# Patient Record
Sex: MALE | Race: WHITE | Employment: FULL TIME | URBAN - METROPOLITAN AREA
[De-identification: names, ages, dates, MRNs, and addresses within clinical notes are randomized per-mention and may not be internally consistent; named-entity substitution may affect disease eponyms.]

---

## 2020-09-16 ENCOUNTER — TELEPHONE (OUTPATIENT)
Dept: PRIMARY CARE CLINIC | Age: 30
End: 2020-09-16

## 2020-09-16 NOTE — PROGRESS NOTES
Needs    Financial resource strain: Not on file    Food insecurity     Worry: Not on file     Inability: Not on file    Transportation needs     Medical: Not on file     Non-medical: Not on file   Tobacco Use    Smoking status: Never Smoker    Smokeless tobacco: Never Used   Substance and Sexual Activity    Alcohol use: Yes     Comment: 2 times a month    Drug use: Never    Sexual activity: Yes   Lifestyle    Physical activity     Days per week: Not on file     Minutes per session: Not on file    Stress: Not on file   Relationships    Social connections     Talks on phone: Not on file     Gets together: Not on file     Attends Faith service: Not on file     Active member of club or organization: Not on file     Attends meetings of clubs or organizations: Not on file     Relationship status: Not on file    Intimate partner violence     Fear of current or ex partner: Not on file     Emotionally abused: Not on file     Physically abused: Not on file     Forced sexual activity: Not on file   Other Topics Concern    Not on file   Social History Narrative    Not on file        Family History   Problem Relation Age of Onset    No Known Problems Mother     High Blood Pressure Father     Cancer Maternal Grandmother     Cancer Maternal Grandfather        Vitals:    09/17/20 1102 09/17/20 1136   BP: (!) 145/66 124/72   Pulse: 70    Temp: 96.2 °F (35.7 °C)    TempSrc: Oral    Weight: 212 lb 2 oz (96.2 kg)    Height: 6' 2\" (1.88 m)      Estimated body mass index is 27.24 kg/m² as calculated from the following:    Height as of this encounter: 6' 2\" (1.88 m). Weight as of this encounter: 212 lb 2 oz (96.2 kg). Physical Exam  Vitals signs reviewed. Constitutional:       Appearance: Normal appearance. He is normal weight. Comments: Very muscular; BMI not reflective of true fat composition   HENT:      Head: Normocephalic and atraumatic.       Nose: Nose normal.   Eyes:      Extraocular Movements: Extraocular movements intact. Conjunctiva/sclera: Conjunctivae normal.   Neck:      Musculoskeletal: Normal range of motion and neck supple. Cardiovascular:      Rate and Rhythm: Normal rate and regular rhythm. Pulses: Normal pulses. Pulmonary:      Effort: Pulmonary effort is normal.      Breath sounds: Normal breath sounds. Abdominal:      General: Abdomen is flat. Bowel sounds are normal.      Palpations: Abdomen is soft. Musculoskeletal: Normal range of motion. Skin:     General: Skin is warm and dry. Capillary Refill: Capillary refill takes less than 2 seconds. Findings: No rash. Neurological:      General: No focal deficit present. Mental Status: He is alert and oriented to person, place, and time. Mental status is at baseline. Psychiatric:         Mood and Affect: Mood normal.         Behavior: Behavior normal.         Thought Content: Thought content normal.         Judgment: Judgment normal.         Separate Identifiable issues addressed today:    ASSESSMENT/PLAN:  Ayaka Ledezma was seen today for annual exam and new patient. Diagnoses and all orders for this visit:    Encounter for medical examination to establish care: Does outstanding with diet, exercise. Encourage continuation. Do worry about sustainability.  -     CBC; Future  -     COMPREHENSIVE METABOLIC PANEL; Future  -     Testosterone, free, total; Future    Screening for HIV (human immunodeficiency virus)  -     HIV-1 AND HIV-2 ANTIBODIES; Future    Screening, lipid  -     Lipid, Fasting; Future    Screening examination for STD (sexually transmitted disease): currently monogamous, multiple partners in past. Would like screen.  -     C.trachomatis N.gonorrhoeae DNA, Urine; Future      Return in about 1 year (around 9/17/2021). An  electronic signature was used to authenticate this note.     --Jennifer Ash,  on 9/17/2020 at 12:12 PM

## 2020-09-17 ENCOUNTER — OFFICE VISIT (OUTPATIENT)
Dept: PRIMARY CARE CLINIC | Age: 30
End: 2020-09-17
Payer: COMMERCIAL

## 2020-09-17 VITALS
WEIGHT: 212.13 LBS | DIASTOLIC BLOOD PRESSURE: 72 MMHG | SYSTOLIC BLOOD PRESSURE: 124 MMHG | HEART RATE: 70 BPM | BODY MASS INDEX: 27.22 KG/M2 | TEMPERATURE: 96.2 F | HEIGHT: 74 IN

## 2020-09-17 DIAGNOSIS — Z11.3 SCREENING EXAMINATION FOR STD (SEXUALLY TRANSMITTED DISEASE): ICD-10-CM

## 2020-09-17 DIAGNOSIS — Z13.220 SCREENING, LIPID: ICD-10-CM

## 2020-09-17 DIAGNOSIS — Z00.00 ENCOUNTER FOR MEDICAL EXAMINATION TO ESTABLISH CARE: ICD-10-CM

## 2020-09-17 DIAGNOSIS — Z11.4 SCREENING FOR HIV (HUMAN IMMUNODEFICIENCY VIRUS): ICD-10-CM

## 2020-09-17 LAB
A/G RATIO: 2 (ref 1.1–2.2)
ALBUMIN SERPL-MCNC: 4.9 G/DL (ref 3.4–5)
ALP BLD-CCNC: 47 U/L (ref 40–129)
ALT SERPL-CCNC: 23 U/L (ref 10–40)
ANION GAP SERPL CALCULATED.3IONS-SCNC: 9 MMOL/L (ref 3–16)
AST SERPL-CCNC: 25 U/L (ref 15–37)
BILIRUB SERPL-MCNC: 0.7 MG/DL (ref 0–1)
BUN BLDV-MCNC: 18 MG/DL (ref 7–20)
CALCIUM SERPL-MCNC: 10.1 MG/DL (ref 8.3–10.6)
CHLORIDE BLD-SCNC: 103 MMOL/L (ref 99–110)
CHOLESTEROL, FASTING: 182 MG/DL (ref 0–199)
CO2: 25 MMOL/L (ref 21–32)
CREAT SERPL-MCNC: 1.1 MG/DL (ref 0.9–1.3)
GFR AFRICAN AMERICAN: >60
GFR NON-AFRICAN AMERICAN: >60
GLOBULIN: 2.4 G/DL
GLUCOSE BLD-MCNC: 87 MG/DL (ref 70–99)
HCT VFR BLD CALC: 47.3 % (ref 40.5–52.5)
HDLC SERPL-MCNC: 59 MG/DL (ref 40–60)
HEMOGLOBIN: 15.6 G/DL (ref 13.5–17.5)
LDL CHOLESTEROL CALCULATED: 113 MG/DL
MCH RBC QN AUTO: 30.1 PG (ref 26–34)
MCHC RBC AUTO-ENTMCNC: 33 G/DL (ref 31–36)
MCV RBC AUTO: 91.3 FL (ref 80–100)
PDW BLD-RTO: 12.8 % (ref 12.4–15.4)
PLATELET # BLD: 231 K/UL (ref 135–450)
PMV BLD AUTO: 9.1 FL (ref 5–10.5)
POTASSIUM SERPL-SCNC: 5.7 MMOL/L (ref 3.5–5.1)
RBC # BLD: 5.18 M/UL (ref 4.2–5.9)
SODIUM BLD-SCNC: 137 MMOL/L (ref 136–145)
TOTAL PROTEIN: 7.3 G/DL (ref 6.4–8.2)
TRIGLYCERIDE, FASTING: 49 MG/DL (ref 0–150)
VLDLC SERPL CALC-MCNC: 10 MG/DL
WBC # BLD: 8.9 K/UL (ref 4–11)

## 2020-09-17 PROCEDURE — 99385 PREV VISIT NEW AGE 18-39: CPT | Performed by: STUDENT IN AN ORGANIZED HEALTH CARE EDUCATION/TRAINING PROGRAM

## 2020-09-17 RX ORDER — AMOXICILLIN 500 MG/1
CAPSULE ORAL
COMMUNITY
Start: 2020-09-12 | End: 2021-02-15

## 2020-09-17 ASSESSMENT — PATIENT HEALTH QUESTIONNAIRE - PHQ9
SUM OF ALL RESPONSES TO PHQ9 QUESTIONS 1 & 2: 0
2. FEELING DOWN, DEPRESSED OR HOPELESS: 0
SUM OF ALL RESPONSES TO PHQ QUESTIONS 1-9: 0
SUM OF ALL RESPONSES TO PHQ QUESTIONS 1-9: 0
1. LITTLE INTEREST OR PLEASURE IN DOING THINGS: 0

## 2020-09-17 ASSESSMENT — ENCOUNTER SYMPTOMS
BLOOD IN STOOL: 0
COUGH: 0
SHORTNESS OF BREATH: 0

## 2020-09-18 LAB
C. TRACHOMATIS DNA ,URINE: NEGATIVE
HIV AG/AB: NORMAL
HIV ANTIGEN: NORMAL
HIV-1 ANTIBODY: NORMAL
HIV-2 AB: NORMAL
N. GONORRHOEAE DNA, URINE: NEGATIVE

## 2020-09-19 LAB
SEX HORMONE BINDING GLOBULIN: 35 NMOL/L (ref 11–80)
TESTOSTERONE FREE-NONMALE: 154.2 PG/ML (ref 47–244)
TESTOSTERONE TOTAL: 713 NG/DL (ref 220–1000)

## 2021-02-16 ENCOUNTER — VIRTUAL VISIT (OUTPATIENT)
Dept: PRIMARY CARE CLINIC | Age: 31
End: 2021-02-16
Payer: COMMERCIAL

## 2021-02-16 DIAGNOSIS — F98.8 ATTENTION DEFICIT DISORDER (ADD) WITHOUT HYPERACTIVITY: Primary | ICD-10-CM

## 2021-02-16 PROCEDURE — G8427 DOCREV CUR MEDS BY ELIG CLIN: HCPCS | Performed by: STUDENT IN AN ORGANIZED HEALTH CARE EDUCATION/TRAINING PROGRAM

## 2021-02-16 PROCEDURE — G8419 CALC BMI OUT NRM PARAM NOF/U: HCPCS | Performed by: STUDENT IN AN ORGANIZED HEALTH CARE EDUCATION/TRAINING PROGRAM

## 2021-02-16 PROCEDURE — G8484 FLU IMMUNIZE NO ADMIN: HCPCS | Performed by: STUDENT IN AN ORGANIZED HEALTH CARE EDUCATION/TRAINING PROGRAM

## 2021-02-16 PROCEDURE — 99214 OFFICE O/P EST MOD 30 MIN: CPT | Performed by: STUDENT IN AN ORGANIZED HEALTH CARE EDUCATION/TRAINING PROGRAM

## 2021-02-16 PROCEDURE — 1036F TOBACCO NON-USER: CPT | Performed by: STUDENT IN AN ORGANIZED HEALTH CARE EDUCATION/TRAINING PROGRAM

## 2021-02-16 RX ORDER — DEXTROAMPHETAMINE SACCHARATE, AMPHETAMINE ASPARTATE, DEXTROAMPHETAMINE SULFATE AND AMPHETAMINE SULFATE 7.5; 7.5; 7.5; 7.5 MG/1; MG/1; MG/1; MG/1
30 TABLET ORAL DAILY
Qty: 30 TABLET | Refills: 0 | Status: SHIPPED | OUTPATIENT
Start: 2021-02-16 | End: 2022-01-12 | Stop reason: SDUPTHER

## 2021-02-16 ASSESSMENT — ENCOUNTER SYMPTOMS
CHEST TIGHTNESS: 0
SHORTNESS OF BREATH: 0
COUGH: 0

## 2021-02-16 NOTE — PATIENT INSTRUCTIONS
Patient Education        Learning About Attention Deficit Hyperactivity Disorder (ADHD) in Adults  What is ADHD? Attention deficit hyperactivity disorder (ADHD) is a condition in which people have a hard time paying attention. Adults with ADHD also may be more active than normal. They tend to act without thinking. ADHD may make it harder for them to focus, get organized, and finish tasks. ADHD most often starts in childhood and lasts into adulthood. Many adults don't know that they have ADHD until their children are diagnosed. Then they begin to see their own symptoms. Doctors don't know what causes ADHD. But it tends to run in families. What are the symptoms? The most common types of ADHD symptoms in adults are attention problems and hyperactivity. Attention problems  Adults with ADHD often find it hard to:  · Finish tasks that don't interest them or aren't easy. But they may become obsessed with activities that they find interesting and enjoy. · Keep relationships. · Focus their attention on conversations, reading materials, or jobs. They may change jobs a lot. · Remember things. They may misplace or lose things. · Pay attention. They are easily distracted. They find it hard to focus on one task. · Think before they act. They may make quick decisions. They may act before they think about the effect of their actions. Hyperactivity  Adults with ADHD may:  · Fidget. They may swing their legs, shift in their seats, or tap their fingers. · Move around a lot. They may feel \"revved up\" or on the go. They may not be able to slow down until they are very tired. · Find it hard to relax. They may feel restless and find it hard to do quiet things like read or watch TV. How does ADHD affect daily life?   ADHD in adults may affect: · Job performance. They may find it hard to organize their work, manage their time, and focus on one task at a time. They may forget, misplace, or lose things. They may quit their jobs out of boredom. · Relationships. Adults with ADHD may find it hard to focus their attention on conversations. It is hard for them to \"read\" the behavior and moods of others and express their own feelings. · Temper. They may get easily frustrated. This often can make it harder for them to deal with stress. These adults may overreact and have a short, quick temper. · The ability to solve problems. Adults who have a hard time waiting for things they want may act before they think about the effect of their actions. They may take part in risky behaviors. These include unprotected sex, unsafe driving, alcohol and drug use, or unwise business ventures. How is ADHD treated? ADHD can be treated with medicines, behavior training, or counseling. Or it may be a combination of these treatments. Medicines  Stimulant medicines are most often used to treat ADHD. These may include:    · Amphetamines (such as Adderall and Dexedrine).     · Methylphenidate (such as Concerta, Daytrana, Focalin, Metadate, and Ritalin). Other medicines that may be used are:    · Atomoxetine, such as Strattera, a nonstimulant medicine for ADHD.     · Antihypertensives. These include clonidine (such as Catapres) and guanfacine (such as Tenex).   · Antidepressants, which include bupropion (Wellbutrin). Behavior training  Behavior training can help adults with ADHD learn how to:    · Get organized. A daily organizer or planner can help these adults organize their daily tasks. They can write down appointments and other things they need to remember.   · Decrease distractions. They can set up their work or home environment so that there are fewer things that will distract them. They may find using headphones or a \"white noise\" machine helpful. College students can arrange a quiet living situation. They may need a single dorm room.     · Work on relationships. Social skills training can help adults with ADHD relate to family, friends, and coworkers. Couples counseling or family therapy can also help improve relationships. Counseling  Counseling is not meant to treat inattention, hyperactivity, or impulsiveness. But it can help with some of the problems that go along with ADHD. These include not getting along well with others and having problems following rules. Where can you learn more? Go to https://IunikapeSynlogiceb.Shoutfit. org and sign in to your [facility] account. Enter U957 in the Peer.im box to learn more about \"Learning About Attention Deficit Hyperactivity Disorder (ADHD) in Adults. \"     If you do not have an account, please click on the \"Sign Up Now\" link. Current as of: January 31, 2020               Content Version: 12.6  © 7148-5731 [employer]. Care instructions adapted under license by [facility] ([facility]). If you have questions about a medical condition or this instruction, always ask your healthcare professional. [address] any warranty or liability for your use of this information.

## 2021-02-16 NOTE — PROGRESS NOTES
2021    TELEHEALTH EVALUATION -- Audio/Visual (During NTDKZ-54 public health emergency)    HPI:    Kristine Do (:  1990) has requested an audio/video evaluation for the following concern(s):    ADD/ADHD: Patient presents today for evaluation of ADD/ADHD. Patient has a prior diagnosis from his pediatrician. He had been treated with several different medications up until about age 20. 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 Since this change he has had a significant return of his symptoms. It is very difficult for him to sit and complete his studying/schoolwork in a timely manner and his grades are suffering because of this. He would like to go back on his medication. He was most recently on Adderall 30 mg. He denies nervousness, anxiousness, depression, agitation or recent change in weight. Review of Systems   Constitutional: Negative for fatigue. Eyes: Negative for visual disturbance. Respiratory: Negative for cough, chest tightness and shortness of breath. Cardiovascular: Negative for chest pain, palpitations and leg swelling. Endocrine: Negative for polydipsia, polyphagia and polyuria. Genitourinary: Negative for frequency. Skin: Negative for rash. Neurological: Negative for dizziness, syncope, weakness and light-headedness. Psychiatric/Behavioral: Positive for decreased concentration. Negative for agitation and sleep disturbance. The patient is not nervous/anxious. Prior to Visit Medications    Medication Sig Taking? Authorizing Provider   amphetamine-dextroamphetamine (ADDERALL, 30MG,) 30 MG tablet Take 1 tablet by mouth daily for 30 days.  Yes Seth Painter DO       Social History     Tobacco Use    Smoking status: Never Smoker    Smokeless tobacco: Never Used   Substance Use Topics  Alcohol use: Yes     Comment: 2 times a month    Drug use: Never        Past Medical History:   Diagnosis Date    ADHD (attention deficit hyperactivity disorder)        PHYSICAL EXAMINATION:  There were no vitals taken for this visit. Wt Readings from Last 3 Encounters:   09/17/20 212 lb 2 oz (96.2 kg)       [ INSTRUCTIONS:  \"[x]\" Indicates a positive item  \"[]\" Indicates a negative item  -- DELETE ALL ITEMS NOT EXAMINED]  [x] Alert  [x] Oriented to person/place/time    [x] No apparent distress  []  Appears Unwell:     [x] Breathing appears normal  [] Appears tachypneic      [] Rash on visible skin:    [x] Cranial Nerves II-XII grossly intact    [] Motor grossly intact in visible upper extremities    [] Motor grossly intact in visible lower extremities    [x] Normal Mood  [] Anxious appearing    [] Depressed appearing     [] OTHER:      Due to this being a TeleHealth encounter, evaluation of the following organ systems is limited: Vitals/Constitutional/EENT/Resp/CV/GI//MS/Neuro/Skin/Heme-Lymph-Imm. Lab Results   Component Value Date     09/17/2020    K 5.7 (H) 09/17/2020     09/17/2020    CO2 25 09/17/2020    BUN 18 09/17/2020    CREATININE 1.1 09/17/2020    GLUCOSE 87 09/17/2020    CALCIUM 10.1 09/17/2020    PROT 7.3 09/17/2020    LABALBU 4.9 09/17/2020    BILITOT 0.7 09/17/2020    ALKPHOS 47 09/17/2020    AST 25 09/17/2020    ALT 23 09/17/2020    LABGLOM >60 09/17/2020    GFRAA >60 09/17/2020    AGRATIO 2.0 09/17/2020    GLOB 2.4 09/17/2020     No results found for: LABA1C  No results found for: EAG      ASSESSMENT/PLAN:  1. Attention deficit disorder (ADD) without hyperactivity: Previous diagnosis of ADD and recent change in school/work has caused return of his symptoms. Will resume treatment. Need to obtain outside records, when he is able to get in the office he will sign a release form. - amphetamine-dextroamphetamine (ADDERALL, 30MG,) 30 MG tablet; Take 1 tablet by mouth daily for 30 days. Dispense: 30 tablet; Refill: 0      Return in about 4 weeks (around 3/16/2021). An  electronic signature was used to authenticate this note. --Seth Painter DO on 2/16/2021 at 10:59 AM        Pursuant to the emergency declaration under the [address] [place] [address], [facility] waiver authority and the [employer] Act, this Virtual  Visit was conducted, with patient's consent, to reduce the patient's risk of exposure to COVID-19 and provide continuity of care for an established patient. Services were provided through a video synchronous discussion virtually to substitute for in-person clinic visit.

## 2021-10-18 ENCOUNTER — PATIENT MESSAGE (OUTPATIENT)
Dept: PRIMARY CARE CLINIC | Age: 31
End: 2021-10-18

## 2021-10-18 NOTE — TELEPHONE ENCOUNTER
From: Esha English  To: María Barron DO  Sent: 10/18/2021 9:15 AM EDT  Subject: Prescription Question    Hi Dr. Deejay Harris,     Could you please write me a prescription for 30 day Amoxicillin 500mg? I'm trying to steer away from asking the dermatologist in [address] [place] [address] to do this anymore for me as they are slow with the request or they don't respond at all.     Thanks,  Rox Gurrola

## 2021-10-25 PROBLEM — F98.8 ATTENTION DEFICIT DISORDER (ADD) WITHOUT HYPERACTIVITY: Status: ACTIVE | Noted: 2021-10-25

## 2021-10-25 NOTE — PROGRESS NOTES
10/26/2021     Cullen Hazel (:  1990) is a 31 y.o. male, here for evaluation of the following medical concerns:    HPI  {Visit Chronic CHP (Optional):83806}    Review of Systems    Prior to Visit Medications    Medication Sig Taking? Authorizing Provider   amphetamine-dextroamphetamine (ADDERALL, 30MG,) 30 MG tablet Take 1 tablet by mouth daily for 30 days. Vandana Soto DO        Social History     Tobacco Use    Smoking status: Never Smoker    Smokeless tobacco: Never Used   Substance Use Topics    Alcohol use: Yes     Comment: 2 times a month        There were no vitals filed for this visit. Estimated body mass index is 27.24 kg/m² as calculated from the following:    Height as of 20: 6' 2\" (1.88 m). Weight as of 20: 212 lb 2 oz (96.2 kg). Physical Exam    ASSESSMENT/PLAN:  1. Attention deficit disorder (ADD) without hyperactivity  ***      No follow-ups on file. An electronic signature was used to authenticate this note.     --Vandana Soto DO on 10/25/2021 at 5:53 PM

## 2021-10-26 ENCOUNTER — OFFICE VISIT (OUTPATIENT)
Dept: PRIMARY CARE CLINIC | Age: 31
End: 2021-10-26
Payer: COMMERCIAL

## 2021-10-26 VITALS
DIASTOLIC BLOOD PRESSURE: 61 MMHG | HEART RATE: 75 BPM | WEIGHT: 215 LBS | BODY MASS INDEX: 27.59 KG/M2 | SYSTOLIC BLOOD PRESSURE: 122 MMHG | HEIGHT: 74 IN

## 2021-10-26 DIAGNOSIS — N52.9 VASCULOGENIC ERECTILE DYSFUNCTION, UNSPECIFIED VASCULOGENIC ERECTILE DYSFUNCTION TYPE: ICD-10-CM

## 2021-10-26 DIAGNOSIS — Z23 NEED FOR IMMUNIZATION AGAINST INFLUENZA: ICD-10-CM

## 2021-10-26 DIAGNOSIS — Z11.3 SCREEN FOR STD (SEXUALLY TRANSMITTED DISEASE): ICD-10-CM

## 2021-10-26 DIAGNOSIS — Z00.00 ROUTINE GENERAL MEDICAL EXAMINATION AT A HEALTH CARE FACILITY: Primary | ICD-10-CM

## 2021-10-26 PROCEDURE — G8482 FLU IMMUNIZE ORDER/ADMIN: HCPCS | Performed by: STUDENT IN AN ORGANIZED HEALTH CARE EDUCATION/TRAINING PROGRAM

## 2021-10-26 PROCEDURE — 90674 CCIIV4 VAC NO PRSV 0.5 ML IM: CPT | Performed by: STUDENT IN AN ORGANIZED HEALTH CARE EDUCATION/TRAINING PROGRAM

## 2021-10-26 PROCEDURE — 90471 IMMUNIZATION ADMIN: CPT | Performed by: STUDENT IN AN ORGANIZED HEALTH CARE EDUCATION/TRAINING PROGRAM

## 2021-10-26 PROCEDURE — 99395 PREV VISIT EST AGE 18-39: CPT | Performed by: STUDENT IN AN ORGANIZED HEALTH CARE EDUCATION/TRAINING PROGRAM

## 2021-10-26 RX ORDER — AMOXICILLIN 500 MG/1
500 CAPSULE ORAL DAILY
Qty: 30 CAPSULE | Refills: 5 | Status: SHIPPED | OUTPATIENT
Start: 2021-10-26 | End: 2022-04-07

## 2021-10-26 RX ORDER — SILDENAFIL 50 MG/1
50 TABLET, FILM COATED ORAL PRN
Qty: 30 TABLET | Refills: 3 | Status: SHIPPED | OUTPATIENT
Start: 2021-10-26 | End: 2022-09-27 | Stop reason: SDUPTHER

## 2021-10-26 SDOH — ECONOMIC STABILITY: FOOD INSECURITY: WITHIN THE PAST 12 MONTHS, THE FOOD YOU BOUGHT JUST DIDN'T LAST AND YOU DIDN'T HAVE MONEY TO GET MORE.: NEVER TRUE

## 2021-10-26 SDOH — ECONOMIC STABILITY: FOOD INSECURITY: WITHIN THE PAST 12 MONTHS, YOU WORRIED THAT YOUR FOOD WOULD RUN OUT BEFORE YOU GOT MONEY TO BUY MORE.: NEVER TRUE

## 2021-10-26 ASSESSMENT — ENCOUNTER SYMPTOMS
SHORTNESS OF BREATH: 0
COUGH: 0
BLOOD IN STOOL: 0

## 2021-10-26 ASSESSMENT — SOCIAL DETERMINANTS OF HEALTH (SDOH): HOW HARD IS IT FOR YOU TO PAY FOR THE VERY BASICS LIKE FOOD, HOUSING, MEDICAL CARE, AND HEATING?: NOT HARD AT ALL

## 2021-10-26 ASSESSMENT — PATIENT HEALTH QUESTIONNAIRE - PHQ9
2. FEELING DOWN, DEPRESSED OR HOPELESS: 0
SUM OF ALL RESPONSES TO PHQ9 QUESTIONS 1 & 2: 0
SUM OF ALL RESPONSES TO PHQ QUESTIONS 1-9: 0
1. LITTLE INTEREST OR PLEASURE IN DOING THINGS: 0
SUM OF ALL RESPONSES TO PHQ QUESTIONS 1-9: 0
SUM OF ALL RESPONSES TO PHQ QUESTIONS 1-9: 0

## 2021-10-26 NOTE — PROGRESS NOTES
10/26/2021    Darline Naranjo (:  1990) is a 31 y.o. male, here for evaluation of the following medical concerns:    HPI    Well Adult Physical: Patient here for a comprehensive physical exam.The patient reports problems - ED  Do you take any herbs or supplements that were not prescribed by a doctor? no Are you taking calcium supplements? not applicable Are you taking aspirin daily? not applicable    Sexual activity: has sex with females   Diet: Healthy  Exercise: aerobics 2 time(s) per week and weight training  5 time(s) per week  Seatbelt use: yes    Review of Systems   Constitutional: Negative for activity change, fatigue and unexpected weight change. HENT: Negative for hearing loss. Eyes: Negative for visual disturbance. Respiratory: Negative for cough and shortness of breath. Cardiovascular: Negative for chest pain and leg swelling. Gastrointestinal: Negative for blood in stool. Endocrine: Negative for polydipsia and polyphagia. Genitourinary: Negative for discharge, dysuria, frequency, penile pain, scrotal swelling and testicular pain. Musculoskeletal: Negative for arthralgias. Skin: Negative for rash. Allergic/Immunologic: Negative for environmental allergies. Neurological: Negative for dizziness and headaches. Hematological: Does not bruise/bleed easily. Psychiatric/Behavioral: Negative for dysphoric mood and sleep disturbance. The patient is not nervous/anxious. Prior to Visit Medications    Medication Sig Taking? Authorizing Provider   amoxicillin (AMOXIL) 500 MG capsule Take 1 capsule by mouth daily Yes Marleny Stewart DO   sildenafil (VIAGRA) 50 MG tablet Take 1 tablet by mouth as needed for Erectile Dysfunction Yes Marleny Stewart DO   amphetamine-dextroamphetamine (ADDERALL, 30MG,) 30 MG tablet Take 1 tablet by mouth daily for 30 days.   Marleny Stewart DO        No Known Allergies    Past Medical History:   Diagnosis Date    ADHD (attention deficit hyperactivity disorder)        History reviewed. No pertinent surgical history. Social History     Socioeconomic History    Marital status: Single     Spouse name: Not on file    Number of children: Not on file    Years of education: Not on file    Highest education level: Not on file   Occupational History    Not on file   Tobacco Use    Smoking status: Never Smoker    Smokeless tobacco: Never Used   Vaping Use    Vaping Use: Never used   Substance and Sexual Activity    Alcohol use: Yes     Comment: 2 times a month    Drug use: Never    Sexual activity: Yes   Other Topics Concern    Not on file   Social History Narrative    Not on file     Social Determinants of Health     Financial Resource Strain: Low Risk     Difficulty of Paying Living Expenses: Not hard at all   Food Insecurity: No Food Insecurity    Worried About [address] in the Last Year: Never true    [facility] in the Last Year: Never true   Transportation Needs:     Lack of Transportation (Medical):      Lack of Transportation (Non-Medical):    Physical Activity:     Days of Exercise per Week:     Minutes of Exercise per Session:    Stress:     Feeling of Stress :    Social Connections:     Frequency of Communication with Friends and Family:     Frequency of Social Gatherings with Friends and Family:     Attends Faith Services:     Active Member of Clubs or Organizations:     Attends Club or Organization Meetings:     Marital Status:    Intimate Partner Violence:     Fear of Current or Ex-Partner:     Emotionally Abused:     Physically Abused:     Sexually Abused:         Family History   Problem Relation Age of Onset    No Known Problems Mother     High Blood Pressure Father     Cancer Maternal Grandmother     Cancer Maternal Grandfather        Vitals:    10/26/21 0816   BP: 122/61   Site: Right Upper Arm   Pulse: 75   Weight: 215 lb (97.5 kg)   Height: 6' 2\" (1.88 m)     Estimated body mass index is 27.6 kg/m² as calculated from the following:    Height as of this encounter: 6' 2\" (1.88 m). Weight as of this encounter: 215 lb (97.5 kg). Physical Exam  Vitals reviewed. Constitutional:       Appearance: Normal appearance. He is normal weight. HENT:      Head: Normocephalic and atraumatic. Right Ear: Tympanic membrane, ear canal and external ear normal.      Left Ear: Tympanic membrane, ear canal and external ear normal.      Nose: Nose normal.      Mouth/Throat:      Mouth: Mucous membranes are moist.      Pharynx: Oropharynx is clear. Eyes:      Extraocular Movements: Extraocular movements intact. Conjunctiva/sclera: Conjunctivae normal.   Cardiovascular:      Rate and Rhythm: Normal rate and regular rhythm. Pulses: Normal pulses. Heart sounds: Normal heart sounds. Pulmonary:      Effort: Pulmonary effort is normal.      Breath sounds: Normal breath sounds. Abdominal:      General: Abdomen is flat. Bowel sounds are normal.      Palpations: Abdomen is soft. Musculoskeletal:         General: Normal range of motion. Cervical back: Normal range of motion and neck supple. Skin:     General: Skin is warm and dry. Capillary Refill: Capillary refill takes less than 2 seconds. Findings: No rash. Neurological:      General: No focal deficit present. Mental Status: He is alert and oriented to person, place, and time. Mental status is at baseline. Psychiatric:         Mood and Affect: Mood normal.         Behavior: Behavior normal.         Thought Content: Thought content normal.         Judgment: Judgment normal.       Separate Identifiable issues addressed today:    Erectile Dysfunction  Patient complains of erectile dysfunction. Onset of dysfunction was a few months ago and was gradual in onset. Patient states the nature of difficulty is maintaining erection, but this is intermittent. Full erections occur with intercourse and with masturbation.  Partial erections occur with intercourse. Libido is not affected. Risk factors for ED include none. Patient denies history of cardiovascular disease, diabetes mellitus, beta blocker use and hypogonadism. Patient has multiple sexual partners. He thinks it could be partially psychological as he had one episode a few months ago. Since then he thinks he has been putting more pressure on himself sexually to preform. Previous treatment of ED includes none. ASSESSMENT/PLAN:  Emma Kinney was seen today for annual exam.    Diagnoses and all orders for this visit:    Routine general medical examination at a health care facility: Vitals reviewed with normal limits. BMI is nonobese. Reviewed exercise regimen patient recommend appropriate lifestyle modifications. Vasculogenic erectile dysfunction, unspecified vasculogenic erectile dysfunction type: Intermittent episodes of difficulty maintaining erection. There is a psychologic component. He would like some Viagra to use as needed. He thinks this would help take some pressure off of him performing sexually. Okay with doing this. He has no contraindications to treatment. -     sildenafil (VIAGRA) 50 MG tablet; Take 1 tablet by mouth as needed for Erectile Dysfunction    Screen for STD (sexually transmitted disease)  -     C.trachomatis N.gonorrhoeae DNA, Urine; Future    Need for immunization against influenza  -     INFLUENZA, MDCK QUADV, 2 YRS AND OLDER, IM, PF, PREFILL SYR OR SDV, 0.5ML (FLUCELVAX QUADV, PF)        Return in about 3 months (around 1/26/2022) for ADD/ADHD. An  electronic signature was used to authenticate this note.     --Zarina Sepulveda, DO on 10/26/2021 at 8:36 AM

## 2021-10-26 NOTE — PATIENT INSTRUCTIONS
Patient Education        Learning About Attention Deficit Hyperactivity Disorder (ADHD) in Adults  What is ADHD? Attention deficit hyperactivity disorder (ADHD) is a condition in which people have a hard time paying attention. Adults with ADHD also may be more active than normal. They tend to act without thinking. ADHD may make it harder for them to focus, get organized, and finish tasks. ADHD most often starts in childhood and lasts into adulthood. Many adults don't know that they have ADHD until their children are diagnosed. Then they begin to see their own symptoms. Doctors don't know what causes ADHD. But it tends to run in families. What are the symptoms? The most common types of ADHD symptoms in adults are attention problems and hyperactivity. Attention problems  Adults with ADHD often find it hard to:  · Finish tasks that don't interest them or aren't easy. But they may become obsessed with activities that they find interesting and enjoy. · Keep relationships. · Focus their attention on conversations, reading materials, or jobs. They may change jobs a lot. · Remember things. They may misplace or lose things. · Pay attention. They are easily distracted. They find it hard to focus on one task. · Think before they act. They may make quick decisions. They may act before they think about the effect of their actions. Hyperactivity  Adults with ADHD may:  · Fidget. They may swing their legs, shift in their seats, or tap their fingers. · Move around a lot. They may feel \"revved up\" or on the go. They may not be able to slow down until they are very tired. · Find it hard to relax. They may feel restless and find it hard to do quiet things like read or watch TV. How does ADHD affect daily life? ADHD in adults may affect:  · Job performance. They may find it hard to organize their work, manage their time, and focus on one task at a time. They may forget, misplace, or lose things.  They may quit their jobs out of boredom. · Relationships. Adults with ADHD may find it hard to focus their attention on conversations. It is hard for them to \"read\" the behavior and moods of others and express their own feelings. · Temper. They may get easily frustrated. This often can make it harder for them to deal with stress. These adults may overreact and have a short, quick temper. · The ability to solve problems. Adults who have a hard time waiting for things they want may act before they think about the effect of their actions. They may take part in risky behaviors. These include unprotected sex, unsafe driving, alcohol and drug use, or unwise business ventures. How is ADHD treated? ADHD can be treated with medicines, behavior training, or counseling. Or it may be a combination of these treatments. Medicines  Stimulant medicines are most often used to treat ADHD. These may include:    · Amphetamines. (Examples are Adderall and Dexedrine).     · Methylphenidate. (Examples are Concerta, Daytrana, Focalin, Metadate, and Ritalin). Other medicines that may be used are:    · Atomoxetine. This includes Strattera, a nonstimulant medicine for ADHD.     · Antihypertensives. These include clonidine (such as Catapres) and guanfacine (such as Tenex).   · Antidepressants. These include bupropion (Wellbutrin). Behavior training  Behavior training can help adults with ADHD learn how to:    · Get organized. A daily organizer or planner can help these adults organize their daily tasks. They can write down appointments and other things they need to remember.     · Decrease distractions. They can set up their work or home environment so that there are fewer things that will distract them. They may find using headphones or a \"white noise\" machine helpful. College students can arrange a quiet living situation. They may need a single dorm room.     · Work on relationships.  Social skills training can help adults with ADHD relate to family, friends, and coworkers. Couples counseling or family therapy can also help improve relationships. Counseling  Counseling is not meant to treat inattention, hyperactivity, or impulsiveness. But it can help with some of the problems that go along with ADHD. These include not getting along well with others and having problems following rules. Where can you learn more? Go to https://chpejesusewnisha.healthCortria [email or web address]. org and sign in to your nanoMR account. Enter C577 in the Novalact box to learn more about \"Learning About Attention Deficit Hyperactivity Disorder (ADHD) in Adults. \"     If you do not have an account, please click on the \"Sign Up Now\" link. Current as of: June 16, 2021               Content Version: 13.0  © 2006-2021 Healthwise, Incorporated. Care instructions adapted under license by [facility] ([facility]). If you have questions about a medical condition or this instruction, always ask your healthcare professional. [address] any warranty or liability for your use of this information.

## 2021-10-27 LAB
C. TRACHOMATIS DNA ,URINE: NEGATIVE
N. GONORRHOEAE DNA, URINE: NEGATIVE

## 2022-01-12 DIAGNOSIS — F98.8 ATTENTION DEFICIT DISORDER (ADD) WITHOUT HYPERACTIVITY: ICD-10-CM

## 2022-01-12 RX ORDER — DEXTROAMPHETAMINE SACCHARATE, AMPHETAMINE ASPARTATE, DEXTROAMPHETAMINE SULFATE AND AMPHETAMINE SULFATE 7.5; 7.5; 7.5; 7.5 MG/1; MG/1; MG/1; MG/1
30 TABLET ORAL DAILY
Qty: 30 TABLET | Refills: 0 | Status: SHIPPED | OUTPATIENT
Start: 2022-01-12 | End: 2022-08-18

## 2022-04-06 NOTE — PROGRESS NOTES
2022     Precilla Schlatter (:  1990) is a 27 y.o. male, here for evaluation of the following medical concerns:    HPI  {Visit Chronic CHP (Optional):13803}    Review of Systems    Prior to Visit Medications    Medication Sig Taking? Authorizing Provider   amphetamine-dextroamphetamine (ADDERALL, 30MG,) 30 MG tablet Take 1 tablet by mouth daily for 30 days. Keke Hdez DO   amoxicillin (AMOXIL) 500 MG capsule Take 1 capsule by mouth daily  Keke Hdez DO   sildenafil (VIAGRA) 50 MG tablet Take 1 tablet by mouth as needed for Erectile Dysfunction  Keke Hdez DO        Social History     Tobacco Use    Smoking status: Never Smoker    Smokeless tobacco: Never Used   Substance Use Topics    Alcohol use: Yes     Comment: 2 times a month        There were no vitals filed for this visit. Estimated body mass index is 27.6 kg/m² as calculated from the following:    Height as of 10/26/21: 6' 2\" (1.88 m). Weight as of 10/26/21: 215 lb (97.5 kg). Physical Exam    ASSESSMENT/PLAN:  1. Attention deficit disorder (ADD) without hyperactivity  ***      No follow-ups on file. An electronic signature was used to authenticate this note.     --Keke Hdez DO on 2022 at 10:01 AM

## 2022-04-06 NOTE — PATIENT INSTRUCTIONS
Patient Education        Learning About Attention Deficit Hyperactivity Disorder (ADHD) in Adults  What is ADHD? Attention deficit hyperactivity disorder (ADHD) is a condition in which people have a hard time paying attention. Adults with ADHD also may be more active than normal. They tend to act without thinking. ADHD may make it harder forthem to focus, get organized, and finish tasks. ADHD most often starts in childhood and lasts into adulthood. Many adults don't know that they have ADHD until their children are diagnosed. Then they begin tosee their own symptoms. Doctors don't know what causes ADHD. But it tends to run in families. What are the symptoms? The most common types of ADHD symptoms in adults are attention problems andhyperactivity. Attention problems  Adults with ADHD often find it hard to:  [employer] tasks that don't interest them or aren't easy. But they may become obsessed with activities that they find interesting and enjoy.  Keep relationships.  Focus their attention on conversations, reading materials, or jobs. They may change jobs a lot.  Remember things. They may misplace or lose things.  Pay attention. They are easily distracted. They find it hard to focus on one task.  Think before they act. They may make quick decisions. They may act before they think about the effect of their actions. Hyperactivity  Adults with ADHD may:   Fidget. They may swing their legs, shift in their seats, or tap their fingers.  Move around a lot. They may feel \"revved up\" or on the go. They may not be able to slow down until they are very tired.  Find it hard to relax. They may feel restless and find it hard to do quiet things like read or watch TV. How does ADHD affect daily life? ADHD in adults may affect:   Job performance. They may find it hard to organize their work, manage their time, and focus on one task at a time. They may forget, misplace, or lose things.  They may quit their jobs out of boredom.  Relationships. Adults with ADHD may find it hard to focus their attention on conversations. It is hard for them to \"read\" the behavior and moods of others and express their own feelings.  Temper. They may get easily frustrated. This often can make it harder for them to deal with stress. These adults may overreact and have a short, quick temper.  The ability to solve problems. Adults who have a hard time waiting for things they want may act before they think about the effect of their actions. They may take part in risky behaviors. These include unprotected sex, unsafe driving, alcohol and drug use, or unwise business ventures. How is ADHD treated? ADHD can be treated with medicines, behavior training, or counseling. Or it maybe a combination of these treatments. Medicines  Stimulant medicines are most often used to treat ADHD. These may include:     Amphetamines. (Examples are Adderall and Dexedrine).      Methylphenidate. (Examples are Concerta, Daytrana, Focalin, Metadate, and Ritalin). Other medicines that may be used are:     Atomoxetine. This includes Strattera, a nonstimulant medicine for ADHD.      Antihypertensives. These include clonidine (such as Catapres) and guanfacine (such as Tenex).    Antidepressants. These include bupropion (Wellbutrin). Behavior training  Behavior training can help adults with ADHD learn how to:     Get organized. A daily organizer or planner can help these adults organize their daily tasks. They can write down appointments and other things they need to remember.      Decrease distractions. They can set up their work or home environment so that there are fewer things that will distract them. They may find using headphones or a \"white noise\" machine helpful. College students can arrange a quiet living situation. They may need a single dorm room.      Work on relationships.  Social skills training can help adults with ADHD relate to family, friends, and coworkers. Couples counseling or family therapy can also help improve relationships. Counseling  Counseling is not meant to treat inattention, hyperactivity, or impulsiveness. But it can help with some of the problems that go along with ADHD. Theseinclude not getting along well with others and having problems following rules. Where can you learn more? Go to https://chnabor.healthWardrobe Housekeeper. org and sign in to your L[employer] account. Enter E637 in the CVRx box to learn more about \"Learning About Attention Deficit Hyperactivity Disorder (ADHD) in Adults. \"     If you do not have an account, please click on the \"Sign Up Now\" link. Current as of: June 16, 2021               Content Version: 13.2  © 2006-2022 Healthwise, Incorporated. Care instructions adapted under license by [facility] ([facility]). If you have questions about a medical condition or this instruction, always ask your healthcare professional. [place] [address] any warranty or liability for your use of this information.

## 2022-04-07 ENCOUNTER — TELEMEDICINE (OUTPATIENT)
Dept: PRIMARY CARE CLINIC | Age: 32
End: 2022-04-07
Payer: COMMERCIAL

## 2022-04-07 DIAGNOSIS — F98.8 ATTENTION DEFICIT DISORDER (ADD) WITHOUT HYPERACTIVITY: Primary | ICD-10-CM

## 2022-04-07 PROCEDURE — G8419 CALC BMI OUT NRM PARAM NOF/U: HCPCS | Performed by: STUDENT IN AN ORGANIZED HEALTH CARE EDUCATION/TRAINING PROGRAM

## 2022-04-07 PROCEDURE — 1036F TOBACCO NON-USER: CPT | Performed by: STUDENT IN AN ORGANIZED HEALTH CARE EDUCATION/TRAINING PROGRAM

## 2022-04-07 PROCEDURE — G8427 DOCREV CUR MEDS BY ELIG CLIN: HCPCS | Performed by: STUDENT IN AN ORGANIZED HEALTH CARE EDUCATION/TRAINING PROGRAM

## 2022-04-07 PROCEDURE — 99214 OFFICE O/P EST MOD 30 MIN: CPT | Performed by: STUDENT IN AN ORGANIZED HEALTH CARE EDUCATION/TRAINING PROGRAM

## 2022-04-07 RX ORDER — MELOXICAM 15 MG/1
15 TABLET ORAL DAILY
COMMUNITY
Start: 2022-03-23 | End: 2022-10-05

## 2022-04-07 ASSESSMENT — ENCOUNTER SYMPTOMS
CHEST TIGHTNESS: 0
COUGH: 0
SHORTNESS OF BREATH: 0
NAUSEA: 0
VOMITING: 0

## 2022-04-07 NOTE — PROGRESS NOTES
2022    TELEHEALTH EVALUATION -- Audio/Visual (During VSJFX-53 public health emergency)    HPI:    Bekah Guzman (:  1990) has requested an audio/video evaluation for the following concern(s):    ADD/ADHD:  Current treatment: Adderall- 30, which has been effective. Residual symptoms: none. Medication side effects: insomnia and irritability. Patient denies anorexia, nausea, vomiting, involuntary weight loss, palpitations and headache. Review of Systems   Constitutional: Negative for activity change, fatigue and unexpected weight change. Eyes: Negative for visual disturbance. Respiratory: Negative for cough, chest tightness and shortness of breath. Cardiovascular: Negative for chest pain, palpitations and leg swelling. Gastrointestinal: Negative for nausea and vomiting. Endocrine: Negative for cold intolerance, heat intolerance, polydipsia, polyphagia and polyuria. Genitourinary: Negative for frequency. Skin: Negative for rash. Neurological: Negative for dizziness, seizures, syncope, weakness and light-headedness. Psychiatric/Behavioral: Positive for agitation and sleep disturbance. Negative for decreased concentration, dysphoric mood, self-injury and suicidal ideas. The patient is not nervous/anxious. Prior to Visit Medications    Medication Sig Taking? Authorizing Provider   meloxicam (MOBIC) 15 MG tablet Take 15 mg by mouth daily Yes Historical Provider, MD   lisdexamfetamine (VYVANSE) 30 MG capsule Take 1 capsule by mouth every morning for 30 days. Yes John Handler, DO   amphetamine-dextroamphetamine (ADDERALL, 30MG,) 30 MG tablet Take 1 tablet by mouth daily for 30 days.   John Handler, DO   sildenafil (VIAGRA) 50 MG tablet Take 1 tablet by mouth as needed for Erectile Dysfunction  Patient not taking: Reported on 2022  John Watkins DO       Social History     Tobacco Use    Smoking status: Never Smoker    Smokeless tobacco: Never Used   Vaping Use    Vaping Use: Never used   Substance Use Topics    Alcohol use: Yes     Comment: 2 times a month    Drug use: Never        Past Medical History:   Diagnosis Date    ADHD (attention deficit hyperactivity disorder)        PHYSICAL EXAMINATION:  There were no vitals taken for this visit. Wt Readings from Last 3 Encounters:   10/26/21 215 lb (97.5 kg)   09/17/20 212 lb 2 oz (96.2 kg)       [ INSTRUCTIONS:  \"[x]\" Indicates a positive item  \"[]\" Indicates a negative item  -- DELETE ALL ITEMS NOT EXAMINED]  [x] Alert  [x] Oriented to person/place/time    [x] No apparent distress  []  Appears Unwell:     [x] Breathing appears normal  [] Appears tachypneic      [] Rash on visible skin:    [x] Cranial Nerves II-XII grossly intact    [x] Motor grossly intact in visible upper extremities    [x] Motor grossly intact in visible lower extremities    [x] Normal Mood  [] Anxious appearing    [] Depressed appearing     [] OTHER:      Due to this being a TeleHealth encounter, evaluation of the following organ systems is limited: Vitals/Constitutional/EENT/Resp/CV/GI//MS/Neuro/Skin/Heme-Lymph-Imm. Lab Results   Component Value Date     09/17/2020    K 5.7 (H) 09/17/2020     09/17/2020    CO2 25 09/17/2020    BUN 18 09/17/2020    CREATININE 1.1 09/17/2020    GLUCOSE 87 09/17/2020    CALCIUM 10.1 09/17/2020    PROT 7.3 09/17/2020    LABALBU 4.9 09/17/2020    BILITOT 0.7 09/17/2020    ALKPHOS 47 09/17/2020    AST 25 09/17/2020    ALT 23 09/17/2020    LABGLOM >60 09/17/2020    GFRAA >60 09/17/2020    AGRATIO 2.0 09/17/2020    GLOB 2.4 09/17/2020     No results found for: LABA1C  No results found for: EAG      ASSESSMENT/PLAN:  1. Attention deficit disorder (ADD) without hyperactivity: Has been stable on Adderall 30 mg for several years, but over the past few months has noted some increased agitation, irritability, insomnia. He tried breaking the Adderall in half with no improvement.   He cannot appreciate any other changes in his dietary or supplement regimen. He would like to try a different medication. We will switch from Adderall to Vyvanse and follow-up in 1 month to evaluate for resolution of side effects and control of symptoms. - lisdexamfetamine (VYVANSE) 30 MG capsule; Take 1 capsule by mouth every morning for 30 days. Dispense: 30 capsule; Refill: 0      Return in about 4 weeks (around 5/5/2022) for ADD/ADHD. An  electronic signature was used to authenticate this note. --Olivia Vega DO on 4/7/2022 at 4:16 PM        Pursuant to the emergency declaration under the [facility], 1135 waiver authority and the [employer] Act, this Virtual  Visit was conducted, with patient's consent, to reduce the patient's risk of exposure to COVID-19 and provide continuity of care for an established patient. Services were provided through a video synchronous discussion virtually to substitute for in-person clinic visit.

## 2022-04-08 ENCOUNTER — TELEPHONE (OUTPATIENT)
Dept: PRIMARY CARE CLINIC | Age: 32
End: 2022-04-08

## 2022-04-08 NOTE — TELEPHONE ENCOUNTER
Pt states that pharmacy did not fill prescription; insurance denied it.  Pt states that if the out of pocket cost is too high, then he would like to try something else or find a way to work around it      lisdexamfetamine (VYVANSE) 30 MG capsule [[id number]]     Order Details  Dose: 30 mg Route: Oral Frequency: EVERY MORNING   Dispense Quantity: 30 capsule Refills: 0          Sig: Take 1 capsule by mouth every morning for 30 days.         Start Date: 04/07/22 End Date: 05/07/22 after 30 doses   Written Date: 04/07/22 Expiration Date: 06/06/22   Earliest Fill Date: 04/07/22         Diagnosis Association: Attention deficit disorder (ADD) without hyperactivity (F98.8)     Providers    Authorizing Provider: Ignacio Collins DO NPI: [id number]   Ordering User:  Ignacio Collins DO          Pharmacy    [facility] [id number] - [id number], [place] - [place] [facility] [address] [place] [address] Drive   [address] [place] [address]   Phone:  [phone]  Fax:  [phone]

## 2022-04-08 NOTE — TELEPHONE ENCOUNTER
lisdexamfetamine (VYVANSE) 30 MG capsule [[id number]]   Order Details   Dose: 30 mg Route: Oral Frequency: EVERY MORNING   Dispense Quantity: 30 capsule Refills: 0         Sig: Take 1 capsule by mouth every morning for 30 days.         Start Date: 04/07/22 End Date: 05/07/22 after 30 doses   Written Date: 04/07/22 Expiration Date: 06/06/22   Earliest Fill Date: 04/07/22        Diagnosis Association: Attention deficit disorder (ADD) without hyperactivity (F98.8)   Providers  Authorizing Provider: Luis A Retana DO NPI: [id number]   Ordering User: Luis A Retana, [address] [place] [id number] - [id number] Nirmala Moody [address] [place] [address] Drive   [address] [place] [address]   Phone: [phone] Fax: [phone]

## 2022-04-08 NOTE — TELEPHONE ENCOUNTER
PA submitted via [facility] for Vyvanse 30MG capsules. Key: [id number] - PA Case ID: [id number]    Insurance wants to know the following: Does the member have a history of failure, contraindication or intolerance to ALL of the following formulary alternatives: Brand Adderall XR, Brand Concerta, methylphenidate extended-release capsule (generic Metadate CD or Ritalin LA)? (Please note: trial of generic is acceptable). Please advise. Please respond to the pool ( P [id number] [phone]). Thank you!

## 2022-04-11 NOTE — TELEPHONE ENCOUNTER
All clinical questions regarding PA for Vyvanse were answered and submitted for review.     STATUS: PENDING

## 2022-04-11 NOTE — TELEPHONE ENCOUNTER
Side effects to adderall. Concerta is listed on this list, which is confusing since that the medicine is the one I sent.

## 2022-04-12 ENCOUNTER — PATIENT MESSAGE (OUTPATIENT)
Dept: PRIMARY CARE CLINIC | Age: 32
End: 2022-04-12

## 2022-04-12 DIAGNOSIS — F98.8 ATTENTION DEFICIT DISORDER (ADD) WITHOUT HYPERACTIVITY: Primary | ICD-10-CM

## 2022-04-12 RX ORDER — METHYLPHENIDATE HYDROCHLORIDE 18 MG/1
18 TABLET ORAL EVERY MORNING
Qty: 30 TABLET | Refills: 0 | Status: SHIPPED | OUTPATIENT
Start: 2022-04-12 | End: 2022-06-28 | Stop reason: SDUPTHER

## 2022-04-12 NOTE — TELEPHONE ENCOUNTER
Pt would like to know I there is a way to get the original medication on his formulary for insurance pt can be reached [phone]

## 2022-04-12 NOTE — TELEPHONE ENCOUNTER
Will you call patient and let them know that the Vyvanse was rejected by his insurance. See if he would be okay with trying Concerta.

## 2022-04-12 NOTE — TELEPHONE ENCOUNTER
From: JUNIOR Steele  To: Meredith Gaffney  Sent: 4/12/2022 9:21 AM EDT  Subject: Medication denial    Goord Morning Delanna Better, I tried giving you a call no answer. Your insurance will not cover the Vyvance.  would like to know if you are willing to try a medication called Concerta?

## 2022-04-29 DIAGNOSIS — F98.8 ATTENTION DEFICIT DISORDER (ADD) WITHOUT HYPERACTIVITY: ICD-10-CM

## 2022-04-29 NOTE — TELEPHONE ENCOUNTER
lisdexamfetamine (VYVANSE) 30 MG capsule [[id number]]   Order Details   Dose: 30 mg Route: Oral Frequency: EVERY MORNING   Dispense Quantity: 30 capsule Refills: 0         Sig: Take 1 capsule by mouth every morning for 30 days. Start Date: 04/07/22 End Date: 05/07/22 after 30 doses   Written Date: 04/07/22 Expiration Date: 06/06/22   Earliest Fill Date: 04/07/22        Diagnosis Association: Attention deficit disorder (ADD) without hyperactivity (F98.8)   Providers  Authorizing Provider: Kristin Ortiz DO NPI: [id number]   Ordering User: Kristin Ortiz, [address] [place] [id number] [place] [address]   Phone: [phone] Fax: [phone]                       Order Class:  Order Class   Normal [1]                                                Warnings History  No Interaction Warnings Shown                             lisdexamfetamine (VYVANSE) 30 MG capsule  Date: 4/7/2022 Department:  [facility] Ordering/Authorizing: Kristin Ortiz DO

## 2022-04-29 NOTE — TELEPHONE ENCOUNTER
Medication:   Requested Prescriptions     Pending Prescriptions Disp Refills    lisdexamfetamine (VYVANSE) 30 MG capsule 30 capsule 0     Sig: Take 1 capsule by mouth every morning for 30 days. Last Filled: 04/07/2022    Patient Phone Number: [phone] (home)   Return in about 4 weeks (around 5/5/2022) for ADD/ADHD. Patient Education        Last appt: 4/7/2022   Next appt: Last OARRS: No flowsheet data found.

## 2022-06-27 ENCOUNTER — PATIENT MESSAGE (OUTPATIENT)
Dept: PRIMARY CARE CLINIC | Age: 32
End: 2022-06-27

## 2022-06-27 DIAGNOSIS — F98.8 ATTENTION DEFICIT DISORDER (ADD) WITHOUT HYPERACTIVITY: ICD-10-CM

## 2022-06-27 NOTE — TELEPHONE ENCOUNTER
From: Jessica Arellano  To: Dr. Lentz Kristian: 6/27/2022 12:52 PM EDT  Subject: concerta refil     could i please get a refil on concerta?

## 2022-06-27 NOTE — TELEPHONE ENCOUNTER
Medication:   Requested Prescriptions     Pending Prescriptions Disp Refills    methylphenidate (CONCERTA) 18 MG extended release tablet 30 tablet 0     Sig: Take 1 tablet by mouth every morning for 30 days. Last Filled:  04/12/22    Patient Phone Number: [phone] (home)     Last appt: 4/7/2022 Return in about 4 weeks (around 5/5/2022) for ADD/ADHD    Next appt: Visit date not found    Last OARRS: No flowsheet data found.

## 2022-06-28 RX ORDER — METHYLPHENIDATE HYDROCHLORIDE 18 MG/1
18 TABLET ORAL EVERY MORNING
Qty: 30 TABLET | Refills: 0 | Status: SHIPPED | OUTPATIENT
Start: 2022-06-28 | End: 2022-08-18 | Stop reason: SDUPTHER

## 2022-08-15 DIAGNOSIS — F98.8 ATTENTION DEFICIT DISORDER (ADD) WITHOUT HYPERACTIVITY: ICD-10-CM

## 2022-08-15 RX ORDER — METHYLPHENIDATE HYDROCHLORIDE 18 MG/1
18 TABLET ORAL EVERY MORNING
Qty: 30 TABLET | Refills: 0 | OUTPATIENT
Start: 2022-08-15 | End: 2022-09-14

## 2022-08-15 NOTE — TELEPHONE ENCOUNTER
Medication:   Requested Prescriptions     Pending Prescriptions Disp Refills    methylphenidate (CONCERTA) 18 MG extended release tablet 30 tablet 0     Sig: Take 1 tablet by mouth every morning for 30 days. Last Filled:  06/28/22    Patient Phone Number: [phone] (home)     Last appt: 4/7/2022 Return in about 4 weeks (around 5/5/2022) for ADD/ADHD. Next appt: Visit date not found    Last OARRS: No flowsheet data found.

## 2022-08-18 RX ORDER — METHYLPHENIDATE HYDROCHLORIDE 18 MG/1
18 TABLET ORAL EVERY MORNING
Qty: 30 TABLET | Refills: 0 | Status: SHIPPED | OUTPATIENT
Start: 2022-08-18 | End: 2022-10-05 | Stop reason: SDUPTHER

## 2022-09-27 DIAGNOSIS — N52.9 VASCULOGENIC ERECTILE DYSFUNCTION, UNSPECIFIED VASCULOGENIC ERECTILE DYSFUNCTION TYPE: ICD-10-CM

## 2022-09-27 RX ORDER — SILDENAFIL 50 MG/1
50 TABLET, FILM COATED ORAL PRN
Qty: 30 TABLET | Refills: 3 | Status: SHIPPED | OUTPATIENT
Start: 2022-09-27

## 2022-09-27 NOTE — TELEPHONE ENCOUNTER
Medication:   Requested Prescriptions     Pending Prescriptions Disp Refills    sildenafil (VIAGRA) 50 MG tablet 30 tablet 3     Sig: Take 1 tablet by mouth as needed for Erectile Dysfunction        Last Filled:  10/26/21    Patient Phone Number: [phone] (home)     Last appt: 4/7/2022 Return in about 4 weeks (around 5/5/2022) for ADD/ADHD. Next appt: Visit date not found    Last OARRS: No flowsheet data found.

## 2022-10-04 ASSESSMENT — ENCOUNTER SYMPTOMS
NAUSEA: 0
VOMITING: 0

## 2022-10-04 NOTE — PROGRESS NOTES
10/5/2022     Roel Rodriguez (:  1990) is a 27 y.o. male, here for evaluation of the following medical concerns:    HPI  {Visit Chronic CHP (Optional):21988}    Review of Systems   Constitutional:  Negative for activity change, fatigue and unexpected weight change. Gastrointestinal:  Negative for nausea and vomiting. Endocrine: Negative for cold intolerance and heat intolerance. Skin:  Negative for rash. Neurological:  Negative for dizziness, seizures and light-headedness. Psychiatric/Behavioral:  Negative for agitation, decreased concentration, dysphoric mood, self-injury, sleep disturbance and suicidal ideas. The patient is not nervous/anxious. Prior to Visit Medications    Medication Sig Taking? Authorizing Provider   sildenafil (VIAGRA) 50 MG tablet Take 1 tablet by mouth as needed for Erectile Dysfunction  Calvin Correa DO   methylphenidate (CONCERTA) 18 MG extended release tablet Take 1 tablet by mouth every morning for 30 days. Calvin Correa DO   meloxicam (MOBIC) 15 MG tablet Take 15 mg by mouth daily  Historical Provider, MD        Social History     Tobacco Use    Smoking status: Never    Smokeless tobacco: Never   Substance Use Topics    Alcohol use: Yes     Comment: 2 times a month        There were no vitals filed for this visit. Estimated body mass index is 27.6 kg/m² as calculated from the following:    Height as of 10/26/21: 6' 2\" (1.88 m). Weight as of 10/26/21: 215 lb (97.5 kg). Physical Exam  Vitals reviewed. Constitutional:       Appearance: Normal appearance. He is normal weight. HENT:      Head: Normocephalic and atraumatic. Nose: Nose normal.   Eyes:      Extraocular Movements: Extraocular movements intact. Conjunctiva/sclera: Conjunctivae normal.   Cardiovascular:      Rate and Rhythm: Normal rate and regular rhythm. Pulmonary:      Effort: Pulmonary effort is normal.      Breath sounds: Normal breath sounds.    Musculoskeletal:      Cervical back: Normal range of motion and neck supple. Skin:     General: Skin is warm and dry. Neurological:      Mental Status: He is alert. Mental status is at baseline. Psychiatric:         Mood and Affect: Mood normal.         Behavior: Behavior normal.         Thought Content: Thought content normal.         Judgment: Judgment normal.       ASSESSMENT/PLAN:  1. Attention deficit disorder (ADD) without hyperactivity  ***    2. Screen for STD (sexually transmitted disease)  ***    3. Encounter for hepatitis C screening test for low risk patient  ***    4. Screening for HIV (human immunodeficiency virus)  ***      No follow-ups on file. An electronic signature was used to authenticate this note.     --Ashanti Mendez DO on 10/4/2022 at 8:48 AM

## 2022-10-04 NOTE — PATIENT INSTRUCTIONS
boredom. Relationships. Adults with ADHD may find it hard to focus their attention on conversations. It is hard for them to \"read\" the behavior and moods of others and express their own feelings. Temper. They may get easily frustrated. This often can make it harder for them to deal with stress. These adults may overreact and have a short, quick temper. The ability to solve problems. Adults who have a hard time waiting for things they want may act before they think about the effect of their actions. They may take part in risky behaviors. These include unprotected sex, unsafe driving, alcohol and drug use, or unwise business ventures. How is ADHD treated? ADHD can be treated with medicines, behavior training, or counseling. Or it may be a combination of these treatments. Medicines  Stimulant medicines are most often used to treat ADHD. These may include:    Amphetamines. (Examples are Adderall and Dexedrine). Methylphenidate. (Examples are Concerta, Daytrana, Focalin, Metadate, and Ritalin). Other medicines that may be used are:    Atomoxetine. This includes Strattera, a nonstimulant medicine for ADHD. Antihypertensives. These include clonidine (such as Catapres) and guanfacine (such as Tenex). Antidepressants. These include bupropion (Wellbutrin). Behavior training  Behavior training can help adults with ADHD learn how to:    Get organized. A daily organizer or planner can help these adults organize their daily tasks. They can write down appointments and other things they need to remember. Decrease distractions. They can set up their work or home environment so that there are fewer things that will distract them. They may find using headphones or a \"white noise\" machine helpful. College students can arrange a quiet living situation. They may need a single dorm room. Work on relationships. Social skills training can help adults with ADHD relate to family, friends, and coworkers.  Couples counseling or family therapy can also help improve relationships. Counseling  Counseling is not meant to treat inattention, hyperactivity, or impulsiveness. But it can help with some of the problems that go along with ADHD. These include not getting along well with others and having problems following rules. Where can you learn more? Go to [email or web address].[email or web address] org and sign in to your [employer] account. Enter P746 in the [employer] box to learn more about \"Learning About Attention Deficit Hyperactivity Disorder (ADHD) in Adults. \"     If you do not have an account, please click on the \"Sign Up Now\" link. Current as of: February 9, 2022               Content Version: 13.4  © 2006-2022 Healthwise, Incorporated. Care instructions adapted under license by [facility] ([facility]). If you have questions about a medical condition or this instruction, always ask your healthcare professional. [address] any warranty or liability for your use of this information.

## 2022-10-05 ENCOUNTER — OFFICE VISIT (OUTPATIENT)
Dept: PRIMARY CARE CLINIC | Age: 32
End: 2022-10-05
Payer: COMMERCIAL

## 2022-10-05 VITALS
TEMPERATURE: 98.2 F | HEIGHT: 74 IN | HEART RATE: 68 BPM | BODY MASS INDEX: 27.21 KG/M2 | WEIGHT: 212 LBS | DIASTOLIC BLOOD PRESSURE: 72 MMHG | SYSTOLIC BLOOD PRESSURE: 112 MMHG

## 2022-10-05 DIAGNOSIS — L70.0 ACNE VULGARIS: ICD-10-CM

## 2022-10-05 DIAGNOSIS — Z11.3 SCREEN FOR STD (SEXUALLY TRANSMITTED DISEASE): ICD-10-CM

## 2022-10-05 DIAGNOSIS — Z13.220 SCREENING FOR HYPERLIPIDEMIA: ICD-10-CM

## 2022-10-05 DIAGNOSIS — Z13.1 SCREENING FOR DIABETES MELLITUS: ICD-10-CM

## 2022-10-05 DIAGNOSIS — Z00.00 ROUTINE GENERAL MEDICAL EXAMINATION AT A HEALTH CARE FACILITY: Primary | ICD-10-CM

## 2022-10-05 DIAGNOSIS — Z11.4 SCREENING FOR HIV (HUMAN IMMUNODEFICIENCY VIRUS): ICD-10-CM

## 2022-10-05 DIAGNOSIS — F98.8 ATTENTION DEFICIT DISORDER (ADD) WITHOUT HYPERACTIVITY: ICD-10-CM

## 2022-10-05 DIAGNOSIS — Z11.59 ENCOUNTER FOR HEPATITIS C SCREENING TEST FOR LOW RISK PATIENT: ICD-10-CM

## 2022-10-05 PROCEDURE — 99395 PREV VISIT EST AGE 18-39: CPT | Performed by: STUDENT IN AN ORGANIZED HEALTH CARE EDUCATION/TRAINING PROGRAM

## 2022-10-05 PROCEDURE — G8484 FLU IMMUNIZE NO ADMIN: HCPCS | Performed by: STUDENT IN AN ORGANIZED HEALTH CARE EDUCATION/TRAINING PROGRAM

## 2022-10-05 RX ORDER — METHYLPHENIDATE HYDROCHLORIDE 18 MG/1
18 TABLET ORAL EVERY MORNING
Qty: 30 TABLET | Refills: 0 | Status: SHIPPED | OUTPATIENT
Start: 2022-10-05 | End: 2022-11-04

## 2022-10-05 RX ORDER — AMOXICILLIN 500 MG/1
500 CAPSULE ORAL 2 TIMES DAILY
Qty: 20 CAPSULE | Refills: 0 | Status: SHIPPED | OUTPATIENT
Start: 2022-10-05 | End: 2022-10-15

## 2022-10-05 RX ORDER — AMOXICILLIN 500 MG/1
CAPSULE ORAL
COMMUNITY
Start: 2022-08-29 | End: 2022-10-05

## 2022-10-05 ASSESSMENT — ENCOUNTER SYMPTOMS
SHORTNESS OF BREATH: 0
NAUSEA: 0
CHEST TIGHTNESS: 0
COUGH: 0
BLOOD IN STOOL: 0
VOMITING: 0

## 2022-10-05 ASSESSMENT — PATIENT HEALTH QUESTIONNAIRE - PHQ9
SUM OF ALL RESPONSES TO PHQ QUESTIONS 1-9: 0
1. LITTLE INTEREST OR PLEASURE IN DOING THINGS: 0
SUM OF ALL RESPONSES TO PHQ QUESTIONS 1-9: 0
SUM OF ALL RESPONSES TO PHQ QUESTIONS 1-9: 0
2. FEELING DOWN, DEPRESSED OR HOPELESS: 0
SUM OF ALL RESPONSES TO PHQ9 QUESTIONS 1 & 2: 0
SUM OF ALL RESPONSES TO PHQ QUESTIONS 1-9: 0

## 2022-10-05 NOTE — PROGRESS NOTES
10/5/2022       TELEHEALTH EVALUATION -- Audio/Visual (During - public health emergency)    HPI:    Silvia Dennis (:  1990) has requested an audio/video evaluation for the following concern(s):    ADD/ADHD:  Current treatment: Vyvanse- ***, which has been somewhat effective. Residual symptoms: {Descriptions; criteria UWSK:26204}. Medication side effects: {ADD  MED SIDE EFFECTS:55589}. Patient denies {ADD  MED SIDE EFFECTS:66242}. Review of Systems   Constitutional:  Negative for activity change, fatigue and unexpected weight change. Eyes:  Negative for visual disturbance. Respiratory:  Negative for cough, chest tightness and shortness of breath. Cardiovascular:  Negative for chest pain, palpitations and leg swelling. Gastrointestinal:  Negative for nausea and vomiting. Endocrine: Negative for cold intolerance, heat intolerance, polydipsia, polyphagia and polyuria. Genitourinary:  Negative for frequency. Skin:  Negative for rash. Neurological:  Negative for dizziness, seizures, syncope, weakness and light-headedness. Psychiatric/Behavioral:  Negative for agitation, decreased concentration, dysphoric mood, self-injury, sleep disturbance and suicidal ideas. The patient is not nervous/anxious. Prior to Visit Medications    Medication Sig Taking? Authorizing Provider   sildenafil (VIAGRA) 50 MG tablet Take 1 tablet by mouth as needed for Erectile Dysfunction  Becka Santamaria DO   methylphenidate (CONCERTA) 18 MG extended release tablet Take 1 tablet by mouth every morning for 30 days.   Becka Santamaria DO   meloxicam (MOBIC) 15 MG tablet Take 15 mg by mouth daily  Historical Provider, MD       Social History     Tobacco Use    Smoking status: Never    Smokeless tobacco: Never   Vaping Use    Vaping Use: Never used   Substance Use Topics    Alcohol use: Yes     Comment: 2 times a month    Drug use: Never        Past Medical History:   Diagnosis Date    ADHD (attention deficit hyperactivity disorder)        PHYSICAL EXAMINATION:  There were no vitals taken for this visit. Wt Readings from Last 3 Encounters:   10/26/21 215 lb (97.5 kg)   09/17/20 212 lb 2 oz (96.2 kg)       [ INSTRUCTIONS:  \"[x]\" Indicates a positive item  \"[]\" Indicates a negative item  -- DELETE ALL ITEMS NOT EXAMINED]  [x] Alert  [x] Oriented to person/place/time    [x] No apparent distress  []  Appears Unwell:     [x] Breathing appears normal  [] Appears tachypneic      [] Rash on visible skin:    [x] Cranial Nerves II-XII grossly intact    [x] Motor grossly intact in visible upper extremities    [x] Motor grossly intact in visible lower extremities    [x] Normal Mood  [] Anxious appearing    [] Depressed appearing     [] OTHER:      Due to this being a TeleHealth encounter, evaluation of the following organ systems is limited: Vitals/Constitutional/EENT/Resp/CV/GI//MS/Neuro/Skin/Heme-Lymph-Imm. Lab Results   Component Value Date     09/17/2020    K 5.7 (H) 09/17/2020     09/17/2020    CO2 25 09/17/2020    BUN 18 09/17/2020    CREATININE 1.1 09/17/2020    GLUCOSE 87 09/17/2020    CALCIUM 10.1 09/17/2020    PROT 7.3 09/17/2020    LABALBU 4.9 09/17/2020    BILITOT 0.7 09/17/2020    ALKPHOS 47 09/17/2020    AST 25 09/17/2020    ALT 23 09/17/2020    LABGLOM >60 09/17/2020    GFRAA >60 09/17/2020    AGRATIO 2.0 09/17/2020    GLOB 2.4 09/17/2020     No results found for: LABA1C  No results found for: EAG      ASSESSMENT/PLAN:  1. Attention deficit disorder (ADD) without hyperactivity  ***    2. Screen for STD (sexually transmitted disease)  ***    3. Encounter for hepatitis C screening test for low risk patient  ***    4. Screening for HIV (human immunodeficiency virus)  ***      No follow-ups on file. Lura Siemens, was evaluated through a synchronous (real-time) audio-video encounter. The patient (or guardian if applicable) is aware that this is a billable service, which includes applicable co-pays.  This Virtual Visit was conducted with patient's (and/or legal guardian's) consent. The visit was conducted pursuant to the emergency declaration under the [address] [place] [address], [address] [place] [address] waiver authority and the [employer] and [employer] General Act. Patient identification was verified, and a caregiver was present when appropriate. The patient was located at Home: [address] [place] [address]. Provider was located at [facility] (Appt Dept): [address] [place] [address] [facility]. Total time spent for this encounter: Not billed by time    --Griselda Kern, DO on 10/5/2022 at 7:43 AM    An electronic signature was used to authenticate this note. An  electronic signature was used to authenticate this note. --Griselda Kern, DO on 10/5/2022 at 7:43 AM    {Coding Help -- Use CPT 20037-47458 with EM elements or Time rules for Office Visits.:41560}    Pursuant to the emergency declaration under the [facility] [address] [facility] waiver authority and the Coronavirus Preparedness and [employer] Act, this Virtual  Visit was conducted, with patient's consent, to reduce the patient's risk of exposure to COVID-19 and provide continuity of care for an established patient. Services were provided through a video synchronous discussion virtually to substitute for in-person clinic visit.

## 2022-10-05 NOTE — PROGRESS NOTES
10/5/2022    Lauren Rose (:  1990) is a 27 y.o. male, here for evaluation of the following medical concerns:    HPI    Well Adult Physical: Patient here for a comprehensive physical exam.The patient reports no problems  Do you take any herbs or supplements that were not prescribed by a doctor? no Are you taking calcium supplements? not applicable Are you taking aspirin daily? not applicable    Sexual activity: has sex with females   Diet: Healthy  Exercise: weight training  6 time(s) per week  Seatbelt use: yes    Review of Systems   Constitutional:  Negative for activity change, fatigue and unexpected weight change. HENT:  Negative for hearing loss. Eyes:  Negative for visual disturbance. Respiratory:  Negative for cough and shortness of breath. Cardiovascular:  Negative for chest pain and leg swelling. Gastrointestinal:  Negative for blood in stool. Endocrine: Negative for polydipsia and polyphagia. Genitourinary:  Negative for dysuria, frequency, penile discharge, penile pain, scrotal swelling and testicular pain. Musculoskeletal:  Negative for arthralgias. Skin:  Negative for rash. Allergic/Immunologic: Negative for environmental allergies. Neurological:  Negative for dizziness and headaches. Hematological:  Does not bruise/bleed easily. Psychiatric/Behavioral:  Negative for dysphoric mood and sleep disturbance. The patient is not nervous/anxious. Prior to Visit Medications    Medication Sig Taking? Authorizing Provider   methylphenidate (CONCERTA) 18 MG extended release tablet Take 1 tablet by mouth every morning for 30 days.  Yes Zollie Nakul, DO   amoxicillin (AMOXIL) 500 MG capsule Take 1 capsule by mouth 2 times daily for 10 days Yes Zollie Nakul, DO   sildenafil (VIAGRA) 50 MG tablet Take 1 tablet by mouth as needed for Erectile Dysfunction Yes Zollie Nakul, DO        No Known Allergies    Past Medical History:   Diagnosis Date    ADHD (attention deficit hyperactivity disorder)        History reviewed. No pertinent surgical history. Social History     Socioeconomic History    Marital status: Single     Spouse name: Not on file    Number of children: Not on file    Years of education: Not on file    Highest education level: Not on file   Occupational History    Not on file   Tobacco Use    Smoking status: Never    Smokeless tobacco: Never   Vaping Use    Vaping Use: Never used   Substance and Sexual Activity    Alcohol use: Yes     Comment: 2 times a month    Drug use: Never    Sexual activity: Yes   Other Topics Concern    Not on file   Social History Narrative    Not on file     Social Determinants of Health     Financial Resource Strain: Low Risk     Difficulty of Paying Living Expenses: Not hard at all   Food Insecurity: No Food Insecurity    Worried About Running Out of Food in the Last Year: Never true    Ran Out of Food in the Last Year: Never true   Transportation Needs: Not on file   Physical Activity: Not on file   Stress: Not on file   Social Connections: Not on file   Intimate Partner Violence: Not on file   Housing Stability: Not on file        Family History   Problem Relation Age of Onset    No Known Problems Mother     High Blood Pressure Father     Cancer Maternal Grandmother     Cancer Maternal Grandfather        Vitals:    10/05/22 1532   BP: 112/72   Pulse: 68   Temp: 98.2 °F (36.8 °C)   TempSrc: Temporal   Weight: 212 lb (96.2 kg)   Height: 6' 2\" (1.88 m)     Estimated body mass index is 27.22 kg/m² as calculated from the following:    Height as of this encounter: 6' 2\" (1.88 m). Weight as of this encounter: 212 lb (96.2 kg). Physical Exam  Vitals reviewed. Constitutional:       Appearance: Normal appearance. He is normal weight. HENT:      Head: Normocephalic and atraumatic.       Right Ear: Tympanic membrane, ear canal and external ear normal.      Left Ear: Tympanic membrane, ear canal and external ear normal.      Nose: Nose normal.      Mouth/Throat:      Mouth: Mucous membranes are moist.      Pharynx: Oropharynx is clear. Eyes:      Extraocular Movements: Extraocular movements intact. Conjunctiva/sclera: Conjunctivae normal.   Cardiovascular:      Rate and Rhythm: Normal rate and regular rhythm. Pulses: Normal pulses. Heart sounds: Normal heart sounds. Pulmonary:      Effort: Pulmonary effort is normal.      Breath sounds: Normal breath sounds. Abdominal:      General: Abdomen is flat. Bowel sounds are normal.      Palpations: Abdomen is soft. Musculoskeletal:         General: Normal range of motion. Cervical back: Normal range of motion and neck supple. Skin:     General: Skin is warm and dry. Capillary Refill: Capillary refill takes less than 2 seconds. Findings: No rash. Neurological:      General: No focal deficit present. Mental Status: He is alert and oriented to person, place, and time. Mental status is at baseline. Psychiatric:         Mood and Affect: Mood normal.         Behavior: Behavior normal.         Thought Content: Thought content normal.         Judgment: Judgment normal.       Separate Identifiable issues addressed today:      ASSESSMENT/PLAN:  Jaziel Lanier was seen today for adhd and annual exam.    Diagnoses and all orders for this visit:    Routine general medical examination at a health care facility: Vitals reviewed and within normal limits. BMI nonobese. Depression screen negative. Reviewed diet and exercise regimen with patient and recommended appropriate modifications for healthy lifestyle. Attention deficit disorder (ADD) without hyperactivity: Symptoms well may return on current regimen. OARRS reviewed and appropriate. Okay for refill and routine follow-up in 6 months. -     methylphenidate (CONCERTA) 18 MG extended release tablet; Take 1 tablet by mouth every morning for 30 days. Screening for hyperlipidemia  -     Lipid, Fasting;  Future    Screening for diabetes mellitus  -     Hemoglobin A1C; Future    Screen for STD (sexually transmitted disease)  -     C.trachomatis N.gonorrhoeae DNA, Urine; Future    Encounter for hepatitis C screening test for low risk patient  -     Hepatitis C Antibody; Future    Screening for HIV (human immunodeficiency virus)  -     HIV Screen; Future    Return in about 6 months (around 4/5/2023) for ADD/ADHD. An  electronic signature was used to authenticate this note.     --Sixto Zavala, DO on 10/5/2022 at 4:00 PM

## 2022-12-13 ENCOUNTER — OFFICE VISIT (OUTPATIENT)
Dept: DERMATOLOGY | Age: 32
End: 2022-12-13
Payer: COMMERCIAL

## 2022-12-13 DIAGNOSIS — L70.0 ACNE VULGARIS: Primary | ICD-10-CM

## 2022-12-13 PROCEDURE — 99204 OFFICE O/P NEW MOD 45 MIN: CPT | Performed by: INTERNAL MEDICINE

## 2022-12-13 RX ORDER — CLINDAMYCIN PHOSPHATE 10 UG/ML
LOTION TOPICAL
Qty: 60 ML | Refills: 4 | Status: SHIPPED | OUTPATIENT
Start: 2022-12-13

## 2022-12-13 RX ORDER — DOXYCYCLINE HYCLATE 100 MG
TABLET ORAL
Qty: 60 TABLET | Refills: 2 | Status: SHIPPED | OUTPATIENT
Start: 2022-12-13

## 2022-12-13 NOTE — PATIENT INSTRUCTIONS
Thank you for visiting [facility] today! Please follow the instructions below as we discussed in clinic:      Start washing face, back with over the counter benzoyl peroxide wash (4% is best) daily.  Then apply clindamycin lotion to all areas where you flare  Start doxycycline 100mg twice a day with food for 3 months  Read about Accutane

## 2022-12-13 NOTE — PROGRESS NOTES
[facility]  Fozia Monterroso MD  [phone]    Date of Visit: 12/13/2022    Jacinta Grant is a 27 y.o. male who presents for acne/cysts. New pt    Chief Complaint:   Chief Complaint   Patient presents with    Acne     Face,arms        History of Present Illness:    Concern:  Acne  Location: Face, arms, back  Duration:  Many years  Symptoms: Pink bumps with flares   Previous treatments:    -Doxycycline for short course--not sure of dose, may have been minocycline but didn't see improvement   -Amoxillin 500mg daily for 2 years--helped maintain/clear acne and keep cysts on face smaller   -No topicals  Current treatments: None  Effect of current treatment: Flaring   Other hx: No B12 supplements etc    *Personal history of skin cancer: None  *Family history of skin cancer: None     Review of Systems:  Gen: Feels well, good sense of health. Skin: No new or changing moles, no history of keloids or hypertrophic scars. Past Medical History, Family History, Surgical History, Medications and Allergies reviewed. Past Medical History:   Diagnosis Date    ADHD (attention deficit hyperactivity disorder)      No past surgical history on file. No Known Allergies  Outpatient Medications Marked as Taking for the 12/13/22 encounter (Office Visit) with Mishel Berman MD   Medication Sig Dispense Refill    doxycycline hyclate (VIBRA-TABS) 100 MG tablet Take 1 pill PO BID 60 tablet 2    clindamycin (CLEOCIN T) 1 % lotion Apply to affected areas where you flare daily 60 mL 4    sildenafil (VIAGRA) 50 MG tablet Take 1 tablet by mouth as needed for Erectile Dysfunction 30 tablet 3       Social History: Works at [employer]   No acute distress. Mood clear/affect appropriate. Alert and oriented. Mucous membranes moist.  Sclera anicteric.   Waist up skin exam was conducted to include the scalp, face, lips, lids/conjunctiva, ears, neck, chest, back, right and left hands and forearms and was normal with the following exceptions:   -Pink inflammatory papules and pustules mostly at beard hairline on cheeks, L neck, upper back. Few pink papules on L arm   -1cm cystic erythematous nodule on R NLF and smaller scarred down cyst on R preauricular ear       Assessment and Plan     1. Acne vulgaris, face--not controlled  -Favor acne + at least 2 more EICs on R cheek that reviewed may not go away without surgical excision. Favor folliculitis spectrum on L arm  - Discussed etiology, pathogenesis, and treatment options for acne   - Recommend start:  Washing face/arms/back with OTC BPO wash, then apply clindamycin lotion   Doxycycline 100mg BID with food x 3 months  Consider Accutane if still active next visit or flares off doxy   - Reviewed side effects of doxycycline including nausea, vomiting, photosensitivity, drug eruptions, birth defects, pseudotumor cerebri (recommend avoid sharing medication with females or others)    RTC 2-3 months     Note is transcribed using voice recognition software. Inadvertent computerized transcription errors may be present.     Marilee Joe MD

## 2023-02-13 DIAGNOSIS — F98.8 ATTENTION DEFICIT DISORDER (ADD) WITHOUT HYPERACTIVITY: ICD-10-CM

## 2023-02-13 RX ORDER — METHYLPHENIDATE HYDROCHLORIDE 18 MG/1
18 TABLET ORAL EVERY MORNING
Qty: 30 TABLET | Refills: 0 | Status: SHIPPED | OUTPATIENT
Start: 2023-02-13 | End: 2023-03-15

## 2023-02-13 NOTE — TELEPHONE ENCOUNTER
Medication:   Requested Prescriptions     Pending Prescriptions Disp Refills    methylphenidate (CONCERTA) 18 MG extended release tablet 30 tablet 0     Sig: Take 1 tablet by mouth every morning for 30 days. Last Filled:  10/5/22    Patient Phone Number: [phone] (home)     Last appt: 10/5/2022   Next appt: Visit date not found    Last OARRS: No flowsheet data found.

## 2023-02-27 NOTE — PROGRESS NOTES
Lisa Sami    Age 34 y.o.    male    1990    MRN [id number]    3/3/2023  Arrival Time_____________  OR Time____________[address]     Procedure(s):  MEDIAL MALLEOLUS FRACTURE OPEN REDUCTION INTERNAL FIXATION LEFT ANKLE, POSSIBLE POSTERIOR MALLEOLUS FRACTURE OPEN REDUCTION INTERNAL FIXATION NOTE: SCIATIC NERVE BLOCK                      General    Surgeon(s):  Jonelle Kingston DPM       Phone [phone] ([place])     [address] [place] [address]  Cell         Work  _____________________________________________________________________  _____________________________________________________________________  _____________________________________________________________________  _____________________________________________________________________  _____________________________________________________________________    PCP _____________________________ Phone_________________     H&P__________________Bringing      Chart            Epic   DOS      Called________  EKG__________________Bringing      Chart            Epic   DOS      Called________  LAB__________________ Bringing      Chart            Epic   DOS      Called________  Cardiac Clearance_______Bringing      Chart            Epic      DOS      Called________    Cardiologist________________________ Phone___________________________    ? Christianity concerns / Waiver on Chart            [employer]________________  ? Pre-op Instructions Given [place]          _________________________________  ? Directions to Surgery Center                          _________________________________  ? Transportation Home_______________      __________________________________  ?  Crutches/Walker__________________        __________________________________    ________Pre-op Orders   _______Transcribed    _______Wt.  ________Pharmacy          _______SCD  ______VTE     ______[facility]  _______  Surgery Consent    _______  Anesthesia Consent         COVID DATE______________LOCATION________________ RESULT__________

## 2023-02-28 RX ORDER — TRAMADOL HYDROCHLORIDE 50 MG/1
50 TABLET ORAL EVERY 6 HOURS PRN
COMMUNITY
End: 2023-03-02

## 2023-02-28 NOTE — PROGRESS NOTES

## 2023-02-28 NOTE — PROGRESS NOTES
Obstructive Sleep Apnea (OLIVER) Screening     Patient:  Dave Moreira    YOB: 1990      Medical Record #:  [id number]                     Date:  2/28/2023     1. Are you a loud and/or regular snorer? []  Yes       [x] No    2. Have you been observed to gasp or stop breathing during sleep? []  Yes       [x] No    3. Do you feel tired or groggy upon awakening or do you awaken with a headache?           []  Yes       [x] No    4. Are you often tired or fatigued during the wake time hours? []  Yes       [x] No    5. Do you fall asleep sitting, reading, watching TV or driving? []  Yes       [x] No    6. Do you often have problems with memory or concentration? []  Yes       [] No    **If patient's score is ? 3 they are considered high risk for OLIVER. An Anesthesia provider will evaluate the patient and develop a plan of care the day of surgery. Note:  If the patient's BMI is more than 35 kg m¯² , has neck circumference > 40 cm, and/or high blood pressure the risk is greater (© American Sleep Apnea Association, 2006).

## 2023-03-01 NOTE — PATIENT INSTRUCTIONS
Patient Education        Learning About How to Prepare for Surgery  How can you prepare before surgery? You can do some things that will help you safely prepare for surgery. Understand exactly what surgery is planned. You should know the risks, benefits, and other options. Tell your doctors ALL the medicines, vitamins, supplements, and herbal remedies you take. Some of these can increase the risk of bleeding. Or they may interact with anesthesia. Follow your doctor's instructions about which medicines to take or stop before your surgery. You may need to stop taking some medicines a week or more before surgery. If you take aspirin or some other blood thinner, be sure to talk to your doctor. Follow any other instructions your doctor gave you. If you have an advance directive, let your doctor know, and bring a copy to the hospital.   It may include a living will and a durable power of  for health care. It lets your doctor and loved ones know your health care wishes. If you don't have one, you may want to prepare one. How can you prepare on the day of surgery? Here are some tips about what to do at home before you leave for your surgery. If your doctor told you to take your medicines on the day of surgery, take them with only a sip of water. Follow the instructions about when to stop eating and drinking. If you don't, your surgery may be canceled. Follow your doctor's instructions about when to bathe or shower before your surgery. Don't use lotions, perfumes, deodorants, or nail polish. Do not shave the surgical site yourself. Take off all jewelry and piercings. Take out contact lenses, if you wear them. Have a picture ID ready to take with you. Your ID will be checked before your surgery. Know when to call your doctor. Call your doctor if you:  Become ill before surgery. Need to reschedule. Have changed your mind about having the surgery. What happens before surgery?   Here are some things you can expect to happen before your surgery. Your picture ID will be checked. The area of your body that needs surgery is often marked to make sure there are no errors. You will be kept comfortable and safe by your anesthesia provider. The anesthesia may make you sleep. Or it may just numb the area being worked on. What happens when you are ready to go home? Be sure you have someone drive you home. Anesthesia and pain medicine make it unsafe for you to drive. You will get instructions about recovering from your surgery. This is called a discharge plan. It will cover things like diet, wound care, follow-up care, driving, and getting back to your normal routine. Follow-up care is a key part of your treatment and safety. Be sure to make and go to all appointments, and call your doctor if you are having problems. It's also a good idea to know your test results and keep a list of the medicines you take. Where can you learn more? Go to http://www.woods.com/ and enter Q270 to learn more about \"Learning About How to Prepare for Surgery. \"  Current as of: June 6, 2022               Content Version: 13.5  © 8537-9379 [employer], Incorporated. Care instructions adapted under license by [facility] ([facility]). If you have questions about a medical condition or this instruction, always ask your healthcare professional. [address] any warranty or liability for your use of this information.

## 2023-03-01 NOTE — PROGRESS NOTES
Preoperative Consultation      Breana Cowart  YOB: 1990    Date of Service:  3/2/2023    Vitals:    03/02/23 0813   BP: 116/78   Pulse: 88   Temp: 97.5 °F (36.4 °C)   TempSrc: Temporal   Weight: 214 lb 6.4 oz (97.3 kg)   Height: 6' 2\" (1.88 m)      Wt Readings from Last 2 Encounters:   03/02/23 214 lb 6.4 oz (97.3 kg)   10/05/22 212 lb (96.2 kg)     BP Readings from Last 3 Encounters:   03/02/23 116/78   10/05/22 112/72   10/26/21 122/61        Chief Complaint   Patient presents with    Pre-op Exam     MEDIAL MALLEOLUS FRACTURE OPEN REDUCTION INTERNAL FIXATION LEFT ANKLE, POSSIBLE POSTERIOR MALLEOLUS FRACTURE OPEN REDUCTION INTERNAL FIXATION NOTE: SCIATIC NERVE BLOCK   surgery set for 03/03/23 by Charlott Sandifer, DPM at [address]     No Known Allergies  Outpatient Medications Marked as Taking for the 3/2/23 encounter (Office Visit) with Shorty Toledo,    Medication Sig Dispense Refill    methylphenidate (CONCERTA) 18 MG extended release tablet Take 1 tablet by mouth every morning for 30 days. 30 tablet 0    sildenafil (VIAGRA) 50 MG tablet Take 1 tablet by mouth as needed for Erectile Dysfunction 30 tablet 3       This patient presents to the office today for a preoperative consultation at the request of surgeon, Dr. Amanda Baxter, who plans on performing open reduction internal fixation of left medial malleolus fracture on March 3 at [facility].  The current problem began 1 week ago, and symptoms have been unchanged with time. Conservative therapy: N/A. Planned anesthesia: General   Known anesthesia problems: None   Bleeding risk: No recent or remote history of abnormal bleeding  Personal or FH of DVT/PE: No    Patient objection to receiving blood products: No    Patient Active Problem List   Diagnosis    Attention deficit disorder (ADD) without hyperactivity       Past Medical History:   Diagnosis Date    ADHD (attention deficit hyperactivity disorder)      History reviewed.  No pertinent surgical history. Family History   Problem Relation Age of Onset    No Known Problems Mother     High Blood Pressure Father     Cancer Maternal Grandmother     Cancer Maternal Grandfather      Social History     Socioeconomic History    Marital status: Single     Spouse name: Not on file    Number of children: Not on file    Years of education: Not on file    Highest education level: Not on file   Occupational History    Not on file   Tobacco Use    Smoking status: Never    Smokeless tobacco: Never   Vaping Use    Vaping Use: Never used   Substance and Sexual Activity    Alcohol use: Yes     Alcohol/week: 2.0 standard drinks     Types: 2 Cans of beer per week    Drug use: Not Currently     Types: Marijuana Ara Dearing)    Sexual activity: Yes   Other Topics Concern    Not on file   Social History Narrative    Not on file     Social Determinants of Health     Financial Resource Strain: Low Risk     Difficulty of Paying Living Expenses: Not hard at all   Food Insecurity: No Food Insecurity    Worried About [address] in the Last Year: Never true    [address] in the Last Year: Never true   Transportation Needs: Unknown    Lack of Transportation (Medical): Not on file    Lack of Transportation (Non-Medical): No   Physical Activity: Not on file   Stress: Not on file   Social Connections: Not on file   Intimate Partner Violence: Not on file   Housing Stability: Unknown    Unable to Pay for Housing in the Last Year: Not on file    Number of Places Lived in the Last Year: Not on file    Unstable Housing in the Last Year: No     Review of Systems   Constitutional:  Negative for activity change, appetite change, fatigue and unexpected weight change. Eyes:  Negative for visual disturbance. Respiratory:  Negative for cough, chest tightness and shortness of breath. Cardiovascular:  Negative for chest pain, palpitations and leg swelling.    Gastrointestinal:  Negative for abdominal distention, abdominal pain, diarrhea and nausea. Endocrine: Negative for polydipsia, polyphagia and polyuria. Genitourinary:  Negative for decreased urine volume, dysuria and frequency. Musculoskeletal:  Negative for myalgias. Skin:  Negative for rash and wound. Neurological:  Negative for dizziness, syncope, weakness, light-headedness and numbness. Hematological:  Does not bruise/bleed easily. Physical Exam   Constitutional: He is oriented to person, place, and time. He appears well-developed and well-nourished. No distress. HENT:   Head: Normocephalic and atraumatic. Mouth/Throat: Uvula is midline, oropharynx is clear and moist and mucous membranes are normal.   Eyes: Conjunctivae and EOM are normal. Pupils are equal, round, and reactive to light. Neck: Trachea normal and normal range of motion. Neck supple. No JVD present. Carotid bruit is not present. No mass and no thyromegaly present. Cardiovascular: Normal rate, regular rhythm, normal heart sounds and intact distal pulses. Exam reveals no gallop and no friction rub. No murmur heard. Pulmonary/Chest: Effort normal and breath sounds normal. No respiratory distress. He has no wheezes. He has no rales. Abdominal: Soft. Normal aorta and bowel sounds are normal. He exhibits no distension and no mass. There is no hepatosplenomegaly. No tenderness. Musculoskeletal: He exhibits no edema and no tenderness. Neurological: He is alert and oriented to person, place, and time. He has normal strength. No cranial nerve deficit or sensory deficit. Coordination and gait normal.   Skin: Skin is warm and dry. No rash noted. No erythema. Psychiatric: He has a normal mood and affect. His behavior is normal.     EKG Interpretation:  N/A. Lab Review N/A        Assessment:       34 y.o. patient with planned surgery as above.     Known risk factors for perioperative complications: None  Current medications which may produce withdrawal symptoms if withheld perioperatively: none      Plan:     1. Preoperative workup as follows: none  2. Change in medication regimen before surgery: None  3. Prophylaxis for cardiac events with perioperative beta-blockers: Not indicated. Revised cardiac index score is category 1 risk or low risk. 4. Deep vein thrombosis prophylaxis: regimen to be chosen by surgical team  5.  No contraindications to planned surgery

## 2023-03-02 ENCOUNTER — OFFICE VISIT (OUTPATIENT)
Dept: PRIMARY CARE CLINIC | Age: 33
End: 2023-03-02
Payer: COMMERCIAL

## 2023-03-02 ENCOUNTER — ANESTHESIA EVENT (OUTPATIENT)
Dept: OPERATING ROOM | Age: 33
End: 2023-03-02
Payer: COMMERCIAL

## 2023-03-02 VITALS
DIASTOLIC BLOOD PRESSURE: 78 MMHG | HEIGHT: 74 IN | HEART RATE: 88 BPM | WEIGHT: 214.4 LBS | BODY MASS INDEX: 27.52 KG/M2 | SYSTOLIC BLOOD PRESSURE: 116 MMHG | TEMPERATURE: 97.5 F

## 2023-03-02 DIAGNOSIS — Z01.818 PREOP GENERAL PHYSICAL EXAM: Primary | ICD-10-CM

## 2023-03-02 DIAGNOSIS — S82.52XA DISPLACED FRACTURE OF MEDIAL MALLEOLUS OF LEFT TIBIA, INITIAL ENCOUNTER FOR CLOSED FRACTURE: ICD-10-CM

## 2023-03-02 PROCEDURE — 99214 OFFICE O/P EST MOD 30 MIN: CPT | Performed by: STUDENT IN AN ORGANIZED HEALTH CARE EDUCATION/TRAINING PROGRAM

## 2023-03-02 SDOH — ECONOMIC STABILITY: FOOD INSECURITY: WITHIN THE PAST 12 MONTHS, YOU WORRIED THAT YOUR FOOD WOULD RUN OUT BEFORE YOU GOT MONEY TO BUY MORE.: NEVER TRUE

## 2023-03-02 SDOH — ECONOMIC STABILITY: HOUSING INSECURITY
IN THE LAST 12 MONTHS, WAS THERE A TIME WHEN YOU DID NOT HAVE A STEADY PLACE TO SLEEP OR SLEPT IN A SHELTER (INCLUDING NOW)?: NO

## 2023-03-02 SDOH — ECONOMIC STABILITY: FOOD INSECURITY: WITHIN THE PAST 12 MONTHS, THE FOOD YOU BOUGHT JUST DIDN'T LAST AND YOU DIDN'T HAVE MONEY TO GET MORE.: NEVER TRUE

## 2023-03-02 SDOH — ECONOMIC STABILITY: INCOME INSECURITY: HOW HARD IS IT FOR YOU TO PAY FOR THE VERY BASICS LIKE FOOD, HOUSING, MEDICAL CARE, AND HEATING?: NOT HARD AT ALL

## 2023-03-02 ASSESSMENT — ENCOUNTER SYMPTOMS
ABDOMINAL DISTENTION: 0
COUGH: 0
DIARRHEA: 0
CHEST TIGHTNESS: 0
ABDOMINAL PAIN: 0
SHORTNESS OF BREATH: 0
NAUSEA: 0

## 2023-03-02 ASSESSMENT — PATIENT HEALTH QUESTIONNAIRE - PHQ9
SUM OF ALL RESPONSES TO PHQ9 QUESTIONS 1 & 2: 0
1. LITTLE INTEREST OR PLEASURE IN DOING THINGS: 0
SUM OF ALL RESPONSES TO PHQ QUESTIONS 1-9: 0
SUM OF ALL RESPONSES TO PHQ QUESTIONS 1-9: 0
2. FEELING DOWN, DEPRESSED OR HOPELESS: 0
SUM OF ALL RESPONSES TO PHQ QUESTIONS 1-9: 0
SUM OF ALL RESPONSES TO PHQ QUESTIONS 1-9: 0

## 2023-03-03 ENCOUNTER — ANESTHESIA (OUTPATIENT)
Dept: OPERATING ROOM | Age: 33
End: 2023-03-03
Payer: COMMERCIAL

## 2023-03-03 ENCOUNTER — HOSPITAL ENCOUNTER (OUTPATIENT)
Age: 33
Setting detail: OUTPATIENT SURGERY
Discharge: HOME | End: 2023-03-03
Attending: PODIATRIST | Admitting: PODIATRIST
Payer: COMMERCIAL

## 2023-03-03 VITALS
TEMPERATURE: 97 F | DIASTOLIC BLOOD PRESSURE: 76 MMHG | WEIGHT: 215 LBS | RESPIRATION RATE: 15 BRPM | OXYGEN SATURATION: 96 % | SYSTOLIC BLOOD PRESSURE: 167 MMHG | HEIGHT: 74 IN | BODY MASS INDEX: 27.59 KG/M2 | HEART RATE: 92 BPM

## 2023-03-03 DIAGNOSIS — S82.842A CLOSED BIMALLEOLAR FRACTURE OF LEFT ANKLE, INITIAL ENCOUNTER: Primary | ICD-10-CM

## 2023-03-03 PROCEDURE — 2500000003 HC RX 250 WO HCPCS: Performed by: ANESTHESIOLOGY

## 2023-03-03 PROCEDURE — C1713 ANCHOR/SCREW BN/BN,TIS/BN: HCPCS | Performed by: PODIATRIST

## 2023-03-03 PROCEDURE — 3600000004 HC SURGERY LEVEL 4 BASE: Performed by: PODIATRIST

## 2023-03-03 PROCEDURE — 3700000000 HC ANESTHESIA ATTENDED CARE: Performed by: PODIATRIST

## 2023-03-03 PROCEDURE — A4217 STERILE WATER/SALINE, 500 ML: HCPCS | Performed by: PODIATRIST

## 2023-03-03 PROCEDURE — 2580000003 HC RX 258: Performed by: ANESTHESIOLOGY

## 2023-03-03 PROCEDURE — 6360000002 HC RX W HCPCS: Performed by: NURSE ANESTHETIST, CERTIFIED REGISTERED

## 2023-03-03 PROCEDURE — 2500000003 HC RX 250 WO HCPCS: Performed by: PODIATRIST

## 2023-03-03 PROCEDURE — 3700000001 HC ADD 15 MINUTES (ANESTHESIA): Performed by: PODIATRIST

## 2023-03-03 PROCEDURE — 64445 NJX AA&/STRD SCIATIC NRV IMG: CPT | Performed by: ANESTHESIOLOGY

## 2023-03-03 PROCEDURE — 6360000002 HC RX W HCPCS: Performed by: PODIATRIST

## 2023-03-03 PROCEDURE — 7100000001 HC PACU RECOVERY - ADDTL 15 MIN: Performed by: PODIATRIST

## 2023-03-03 PROCEDURE — 7100000000 HC PACU RECOVERY - FIRST 15 MIN: Performed by: PODIATRIST

## 2023-03-03 PROCEDURE — 2580000003 HC RX 258: Performed by: PODIATRIST

## 2023-03-03 PROCEDURE — 2500000003 HC RX 250 WO HCPCS: Performed by: NURSE ANESTHETIST, CERTIFIED REGISTERED

## 2023-03-03 PROCEDURE — 7100000011 HC PHASE II RECOVERY - ADDTL 15 MIN: Performed by: PODIATRIST

## 2023-03-03 PROCEDURE — 7100000010 HC PHASE II RECOVERY - FIRST 15 MIN: Performed by: PODIATRIST

## 2023-03-03 PROCEDURE — 2720000010 HC SURG SUPPLY STERILE: Performed by: PODIATRIST

## 2023-03-03 PROCEDURE — 2709999900 HC NON-CHARGEABLE SUPPLY: Performed by: PODIATRIST

## 2023-03-03 PROCEDURE — 3600000014 HC SURGERY LEVEL 4 ADDTL 15MIN: Performed by: PODIATRIST

## 2023-03-03 DEVICE — HEADLESS SCREW
Type: IMPLANTABLE DEVICE | Site: ANKLE | Status: FUNCTIONAL
Brand: MINI

## 2023-03-03 DEVICE — HEADED SCREW
Type: IMPLANTABLE DEVICE | Site: ANKLE | Status: FUNCTIONAL
Brand: MINI

## 2023-03-03 RX ORDER — SODIUM CHLORIDE 0.9 % (FLUSH) 0.9 %
5-40 SYRINGE (ML) INJECTION EVERY 12 HOURS SCHEDULED
Status: DISCONTINUED | OUTPATIENT
Start: 2023-03-03 | End: 2023-03-03 | Stop reason: HOSPADM

## 2023-03-03 RX ORDER — HYDROMORPHONE HCL 110MG/55ML
PATIENT CONTROLLED ANALGESIA SYRINGE INTRAVENOUS PRN
Status: DISCONTINUED | OUTPATIENT
Start: 2023-03-03 | End: 2023-03-03 | Stop reason: SDUPTHER

## 2023-03-03 RX ORDER — BUPIVACAINE HYDROCHLORIDE 5 MG/ML
INJECTION, SOLUTION EPIDURAL; INTRACAUDAL PRN
Status: DISCONTINUED | OUTPATIENT
Start: 2023-03-03 | End: 2023-03-03 | Stop reason: ALTCHOICE

## 2023-03-03 RX ORDER — SUCCINYLCHOLINE CHLORIDE 20 MG/ML
INJECTION INTRAMUSCULAR; INTRAVENOUS PRN
Status: DISCONTINUED | OUTPATIENT
Start: 2023-03-03 | End: 2023-03-03 | Stop reason: SDUPTHER

## 2023-03-03 RX ORDER — SODIUM CHLORIDE 9 MG/ML
INJECTION, SOLUTION INTRAVENOUS PRN
Status: DISCONTINUED | OUTPATIENT
Start: 2023-03-03 | End: 2023-03-03 | Stop reason: HOSPADM

## 2023-03-03 RX ORDER — FENTANYL CITRATE 50 UG/ML
INJECTION, SOLUTION INTRAMUSCULAR; INTRAVENOUS PRN
Status: DISCONTINUED | OUTPATIENT
Start: 2023-03-03 | End: 2023-03-03 | Stop reason: SDUPTHER

## 2023-03-03 RX ORDER — MAGNESIUM HYDROXIDE 1200 MG/15ML
LIQUID ORAL CONTINUOUS PRN
Status: COMPLETED | OUTPATIENT
Start: 2023-03-03 | End: 2023-03-03

## 2023-03-03 RX ORDER — KETOROLAC TROMETHAMINE 30 MG/ML
INJECTION, SOLUTION INTRAMUSCULAR; INTRAVENOUS PRN
Status: DISCONTINUED | OUTPATIENT
Start: 2023-03-03 | End: 2023-03-03 | Stop reason: SDUPTHER

## 2023-03-03 RX ORDER — LIDOCAINE HYDROCHLORIDE 10 MG/ML
1 INJECTION, SOLUTION EPIDURAL; INFILTRATION; INTRACAUDAL; PERINEURAL
Status: DISCONTINUED | OUTPATIENT
Start: 2023-03-03 | End: 2023-03-03 | Stop reason: HOSPADM

## 2023-03-03 RX ORDER — PROMETHAZINE HYDROCHLORIDE 25 MG/1
25 TABLET ORAL EVERY 6 HOURS PRN
Qty: 30 TABLET | Refills: 0 | Status: SHIPPED | OUTPATIENT
Start: 2023-03-03 | End: 2023-03-10

## 2023-03-03 RX ORDER — LIDOCAINE HYDROCHLORIDE 20 MG/ML
INJECTION, SOLUTION INFILTRATION; PERINEURAL PRN
Status: DISCONTINUED | OUTPATIENT
Start: 2023-03-03 | End: 2023-03-03 | Stop reason: SDUPTHER

## 2023-03-03 RX ORDER — CEFAZOLIN SODIUM IN 0.9 % NACL 2 G/100 ML
2000 PLASTIC BAG, INJECTION (ML) INTRAVENOUS ONCE
Status: COMPLETED | OUTPATIENT
Start: 2023-03-03 | End: 2023-03-03

## 2023-03-03 RX ORDER — ACETAMINOPHEN 500 MG
500 TABLET ORAL EVERY 6 HOURS PRN
Status: ON HOLD | COMMUNITY
End: 2023-03-03 | Stop reason: HOSPADM

## 2023-03-03 RX ORDER — ROCURONIUM BROMIDE 10 MG/ML
INJECTION, SOLUTION INTRAVENOUS PRN
Status: DISCONTINUED | OUTPATIENT
Start: 2023-03-03 | End: 2023-03-03 | Stop reason: SDUPTHER

## 2023-03-03 RX ORDER — DEXAMETHASONE SODIUM PHOSPHATE 4 MG/ML
INJECTION, SOLUTION INTRA-ARTICULAR; INTRALESIONAL; INTRAMUSCULAR; INTRAVENOUS; SOFT TISSUE PRN
Status: DISCONTINUED | OUTPATIENT
Start: 2023-03-03 | End: 2023-03-03 | Stop reason: SDUPTHER

## 2023-03-03 RX ORDER — OXYCODONE HYDROCHLORIDE AND ACETAMINOPHEN 5; 325 MG/1; MG/1
1 TABLET ORAL EVERY 6 HOURS PRN
Qty: 24 TABLET | Refills: 0 | Status: SHIPPED | OUTPATIENT
Start: 2023-03-03 | End: 2023-03-09

## 2023-03-03 RX ORDER — SODIUM CHLORIDE 0.9 % (FLUSH) 0.9 %
5-40 SYRINGE (ML) INJECTION PRN
Status: DISCONTINUED | OUTPATIENT
Start: 2023-03-03 | End: 2023-03-03 | Stop reason: HOSPADM

## 2023-03-03 RX ORDER — ONDANSETRON 2 MG/ML
4 INJECTION INTRAMUSCULAR; INTRAVENOUS
Status: DISCONTINUED | OUTPATIENT
Start: 2023-03-03 | End: 2023-03-03 | Stop reason: HOSPADM

## 2023-03-03 RX ORDER — ONDANSETRON 2 MG/ML
INJECTION INTRAMUSCULAR; INTRAVENOUS PRN
Status: DISCONTINUED | OUTPATIENT
Start: 2023-03-03 | End: 2023-03-03 | Stop reason: SDUPTHER

## 2023-03-03 RX ORDER — MIDAZOLAM HYDROCHLORIDE 1 MG/ML
INJECTION INTRAMUSCULAR; INTRAVENOUS
Status: DISCONTINUED
Start: 2023-03-03 | End: 2023-03-03 | Stop reason: HOSPADM

## 2023-03-03 RX ORDER — PROPOFOL 10 MG/ML
INJECTION, EMULSION INTRAVENOUS PRN
Status: DISCONTINUED | OUTPATIENT
Start: 2023-03-03 | End: 2023-03-03 | Stop reason: SDUPTHER

## 2023-03-03 RX ORDER — BUPIVACAINE HYDROCHLORIDE 5 MG/ML
INJECTION, SOLUTION EPIDURAL; INTRACAUDAL
Status: COMPLETED | OUTPATIENT
Start: 2023-03-03 | End: 2023-03-03

## 2023-03-03 RX ORDER — SODIUM CHLORIDE, SODIUM LACTATE, POTASSIUM CHLORIDE, CALCIUM CHLORIDE 600; 310; 30; 20 MG/100ML; MG/100ML; MG/100ML; MG/100ML
INJECTION, SOLUTION INTRAVENOUS CONTINUOUS
Status: DISCONTINUED | OUTPATIENT
Start: 2023-03-03 | End: 2023-03-03 | Stop reason: HOSPADM

## 2023-03-03 RX ADMIN — LIDOCAINE HYDROCHLORIDE 60 MG: 20 INJECTION, SOLUTION INFILTRATION; PERINEURAL at 07:53

## 2023-03-03 RX ADMIN — BUPIVACAINE HYDROCHLORIDE 20 ML: 5 INJECTION, SOLUTION EPIDURAL; INTRACAUDAL; PERINEURAL at 07:24

## 2023-03-03 RX ADMIN — HYDROMORPHONE HYDROCHLORIDE 0.5 MG: 2 INJECTION, SOLUTION INTRAMUSCULAR; INTRAVENOUS; SUBCUTANEOUS at 10:03

## 2023-03-03 RX ADMIN — Medication 2000 MG: at 08:00

## 2023-03-03 RX ADMIN — SUCCINYLCHOLINE CHLORIDE 160 MG: 20 INJECTION, SOLUTION INTRAMUSCULAR; INTRAVENOUS at 07:53

## 2023-03-03 RX ADMIN — HYDROMORPHONE HYDROCHLORIDE 1 MG: 2 INJECTION, SOLUTION INTRAMUSCULAR; INTRAVENOUS; SUBCUTANEOUS at 08:19

## 2023-03-03 RX ADMIN — HYDROMORPHONE HYDROCHLORIDE 0.5 MG: 2 INJECTION, SOLUTION INTRAMUSCULAR; INTRAVENOUS; SUBCUTANEOUS at 09:48

## 2023-03-03 RX ADMIN — PROPOFOL 250 MG: 10 INJECTION, EMULSION INTRAVENOUS at 07:53

## 2023-03-03 RX ADMIN — DEXAMETHASONE SODIUM PHOSPHATE 4 MG: 4 INJECTION, SOLUTION INTRAMUSCULAR; INTRAVENOUS at 08:00

## 2023-03-03 RX ADMIN — PROPOFOL 40 MG: 10 INJECTION, EMULSION INTRAVENOUS at 10:40

## 2023-03-03 RX ADMIN — SODIUM CHLORIDE, POTASSIUM CHLORIDE, SODIUM LACTATE AND CALCIUM CHLORIDE: 600; 310; 30; 20 INJECTION, SOLUTION INTRAVENOUS at 07:02

## 2023-03-03 RX ADMIN — SODIUM CHLORIDE, POTASSIUM CHLORIDE, SODIUM LACTATE AND CALCIUM CHLORIDE: 600; 310; 30; 20 INJECTION, SOLUTION INTRAVENOUS at 08:21

## 2023-03-03 RX ADMIN — ROCURONIUM BROMIDE 10 MG: 10 SOLUTION INTRAVENOUS at 07:53

## 2023-03-03 RX ADMIN — ONDANSETRON 4 MG: 2 INJECTION INTRAMUSCULAR; INTRAVENOUS at 08:00

## 2023-03-03 RX ADMIN — KETOROLAC TROMETHAMINE 30 MG: 30 INJECTION, SOLUTION INTRAMUSCULAR; INTRAVENOUS at 10:23

## 2023-03-03 RX ADMIN — FENTANYL CITRATE 100 MCG: 50 INJECTION INTRAMUSCULAR; INTRAVENOUS at 07:53

## 2023-03-03 ASSESSMENT — PAIN SCALES - GENERAL
PAINLEVEL_OUTOF10: 2
PAINLEVEL_OUTOF10: 4
PAINLEVEL_OUTOF10: 2

## 2023-03-03 ASSESSMENT — PAIN DESCRIPTION - FREQUENCY: FREQUENCY: CONTINUOUS

## 2023-03-03 ASSESSMENT — PAIN DESCRIPTION - DESCRIPTORS: DESCRIPTORS: DISCOMFORT

## 2023-03-03 ASSESSMENT — PAIN DESCRIPTION - ORIENTATION: ORIENTATION: LEFT

## 2023-03-03 ASSESSMENT — PAIN - FUNCTIONAL ASSESSMENT
PAIN_FUNCTIONAL_ASSESSMENT: 0-10
PAIN_FUNCTIONAL_ASSESSMENT: ACTIVITIES ARE NOT PREVENTED

## 2023-03-03 ASSESSMENT — PAIN DESCRIPTION - LOCATION: LOCATION: ANKLE

## 2023-03-03 NOTE — PROGRESS NOTES
Patient awake alert ready to go home. Discharged in no distress accompanied to passenger side of car with family or significant other driving car. Assessment unchanged. Patient states pain minimal and tolerable.

## 2023-03-03 NOTE — PROGRESS NOTES
Mom to bedside updated with no questions. Discharge instructions reviewed with patient and responsible adult. Discharge instructions signed and copy given with no additional questions. Patient to be discharged home with belongings. Percocet and phenergan scripts given. Patient has crutches and knows how to use them.

## 2023-03-03 NOTE — PROGRESS NOTES
Block Time Out      Verified   Correct Pt.   Correct   Correct Procedure  Correct Site  Correct Extremity       Done per Dr. Manfred Robles and Lux murphy RN

## 2023-03-03 NOTE — DISCHARGE INSTRUCTIONS
POST OPERATIVE INSTRUCTIONS  PLEASE READ CAREFULLY         [x]   Rest    [x]   Elevation of left leg while resting to at least the level of the thigh. [x]   Keep ice bag(s) on left ankle while resting;  (20 minutes on and 40 minutes off). [x]   DO NOT remove or get the bandage wet or dirty. [x]   DO NOT cover bandage with plastic (except for showering). [x]         Non-Weight Bearing with crutches or scooter wheelchair. Keep boot on at all times. [x]   Call today [phone]) for [facility]) for follow up appointment within 6 days. [x]   Have your prescriptions filled promptly and take as prescribed. [x]   Do not be alarmed if blood appears on the bandage or if black and blue marks  appear. [x]   If any unusual situation arises- Call Immediately. During regular office hours call Randall Sal at [phone]. After regular office hours call Dr. Bernice Gomez cellphone at [phone]           ANESTHESIA DISCHARGE INSTRUCTIONS    You are under the influence of drugs- do not drink alcohol, drive a car, operate machinery(such as power tools, kitchen appliances, etc), sign legal documents, or make any important decisions for 24 hours (or while on pain medications). Children should not ride bikes or [place] or play on gym sets  for 24 hours after surgery. A responsible adult should be with you for 24 hours. Rest at home today- increase activity as tolerated. Progress slowly to a regular diet unless your physician has instructed you otherwise. Drink plenty of water. CALL YOUR DOCTOR IF YOU:  Have moderate to severe nausea or vomiting AND are unable to hold down fluids or prescribed medications. Have bright red bloody drainage from your dressing that won't stop oozing.   Do not get relief with your pain medication    NORMAL (POSSIBLE) SIDE EFFECTS FROM ANESTHESIA:     Confusion, temporary memory loss, delayed reaction times in the first 24 hours  Lightheadedness, dizziness, difficulty focusing, blurred vision  Nausea/vomiting can happen  Shivering, feeling cold, sore throat, cough and muscle aches should stop within 24-48 hours  Trouble urinating - call your surgeon if it has been more than 8 hrs  Bruising or soreness at the IV site - call if it remains red, firm or there is drainage             FEMALES OF CHILDBEARING AGE WHO ARE TAKING BIRTH CONTROL PILLS:  You may have received a medication during your procedure that interferes with the   actions of birth control pills (Bridion or Emend). Use some other kind of birth control in addition to your pills, like a condom, for 1 month after your procedure to prevent unwanted pregnancy. The following instructions are to be followed if you have a known history or diagnosis of sleep apnea: For all sleep apnea patients:  ? Sleep on your side or sitting up in a chair whenever possible, especially the first 24 hours after surgery. ? Use only medicines prescribed by your doctor. ? Do not drink alcohol. ? If you have a dental device to assist you while at rest, use it at all times for the first 24 hours. For patients using CPAP machines:  ? Use your CPAP machine during all periods of sleep as usual.  ? Use your CPAP machine during all periods of daytime rest while on pain medicines. ** Follow up with your primary care doctor for continued care. IF YOU DO NOT TAKE ALL OF YOUR NARCOTIC PAIN MEDICATION, please dispose of them responsibly. There are drop off boxes in the Emergency Departments 24/7 at both [facility] and [place]. If these locations are not convenient, other options for discarding them can be found at:  [email or web address]    Hospital or office staff may NOT accept any medications to drop off in the cabinet for you. PERIPHERAL NERVE BLOCK INSTRUCTIONS     Please remember while having a nerve block you are at an increased risk for [address]!!   You were given a nerve block today from the anesthesiologist. Most nerve blocks last anywhere from 6-36 hours. You should start taking your pain medication before the block wears off or when you first begin feeling discomfort. It takes at least 30-60 minutes for a pain pill to take effect. Pain medications should be taken with food. Consider setting an alarm through the night to help manage your pain level so you do not wake up with too much pain. Pain medicines can cause more sedation and decrease your breathing so ONLY take as directed. If you have Sleep Apnea, you definitely need to use your C-Pap machine. What to expect after a nerve block:   Numbness, tingling- arm or leg feels heavy or asleep  Weakness or inability to move or control your arm or leg   Inability to feel temperature changes to your arm or leg  Usually the weakness wears off first, followed by the tingling or heaviness. You may notice more pain at this point and should start taking your pain meds. If you had a shoulder block, you may experience:  Mild shortness of breath (may be relieved by sitting up in a chair or recliner)  Hoarse voice  Blurry vision  Unequal pupils  Drooping of your face (eye or lip) on the same side as the nerve block  Swelling at the injection site on the side of your neck  These side effects should resolve as the block wears off! IF you have severe or prolonged shortness of breath-  GO to the nearest Emergency Room! If you had a nerve block of your arm or leg:  Protect the arm or leg from extreme hot or cold temperatures  Protect the arm or leg from obstructing blood flow by frequent position changes- Make sure fingers/ toes stay pink and warm. Call surgeon with any changes  For leg block, get assistance walking until the block wears off, ie:  crutches, walker, support person   If you continue to feel the effects of the nerve block for longer than 72 hours- call Lexy Méndez at [phone] and ask to speak with the Anesthesiologist on call. What is a Surgical Site Infection or  (SSI)? A surgical site infection (SSI) is an infection that occurs after surgery in the part of the body where the surgery took place. Most patients who have surgery do not develop an infection. However, infections can develop in about 1-3 cases for every 100 patients who have had surgery. Our goal is for you to NOT experience any complications and be completely satisfied with your care! However, some signs or symptoms to look for and report immediately to your doctor are:   1. Fever above 101 degrees    2. Redness and increasing pain around the area  where you had surgery   3. Drainage of cloudy fluid or pus coming from the surgical area    Some of the things we/ you can do to prevent SSI's are:   1. Clean hands with soap and water or an alcohol-based hand rub before and after caring for the operative area. This occurs the day of surgery and for the next 2 weeks. 2.Sometimes you receive an appropriate antibiotic within 60 minutes before your surgery or take one for several days after surgery depending on your surgeon's instructions and/or the type of surgery you are having. 3. Family and/or friends who visit you should NOT touch the surgical wound or dressings until advised by your surgeon. 4. Be sure to elevate and decrease the swelling after your surgery to help prevent infection. 5. If you are a diabetic, you need to closely monitor your blood sugar levels and report any significant increases or changes to your surgeon to help promote the healing process.

## 2023-03-03 NOTE — ANESTHESIA POSTPROCEDURE EVALUATION
Department of Anesthesiology  Postprocedure Note    Patient: Puneet Ny  MRN: [id number]  YOB: 1990  Date of evaluation: 3/3/2023      Procedure Summary     Date: 03/03/23 Room / Location: [facility] AT [place] [address] [facility] Irma Liu 01 / [facility]    Anesthesia Start: 7824 Anesthesia Stop: 1100    Procedure: [address], POSTERIOR MALLEOLUS FRACTURE OPEN REDUCTION INTERNAL FIXATION NOTE: SCIATIC NERVE BLOCK                   SHNATEL (Left: Leg Lower) Diagnosis:       Closed displaced fracture of medial malleolus of left tibia, initial encounter      Fracture, posterior malleolus, left, closed, initial encounter      (LEFT MEDIAL MALLEOLAR FRACTURE WITH POSSIBLE LEFT POSTERIOR MALLEOLUS FRACTURE)    Surgeons: Kari Lenz DPM Responsible Provider: Austin Bhardwaj MD    Anesthesia Type: general, regional ASA Status: 2          Anesthesia Type: No value filed.     Madonna Phase I: Madonna Score: 10    Madonna Phase II:        Anesthesia Post Evaluation    Patient location during evaluation: PACU  Patient participation: complete - patient participated  Level of consciousness: awake and alert  Pain score: 3  Airway patency: patent  Nausea & Vomiting: no nausea and no vomiting  Complications: no  Cardiovascular status: blood pressure returned to baseline  Respiratory status: acceptable  Hydration status: stable  Multimodal analgesia pain management approach

## 2023-03-03 NOTE — BRIEF OP NOTE
Brief Postoperative Note      Patient: Attila Noriega  YOB: 1990  MRN: [id number]    Date of Procedure: 3/3/2023    Pre-Op Diagnosis: LEFT MEDIAL MALLEOLAR FRACTURE WITH POSSIBLE LEFT POSTERIOR MALLEOLUS FRACTURE    Post-Op Diagnosis:  1. Medial Malleolar fracture, left, 2. Posterior Malleolus fracture, left       Procedure: 1. Medial Malleolus Fracture open reduction internal fixation, left    2. Posterior Malleolus Fracture open reduction internal fixation, left    Surgeon(s):  Naeem Valdez DPM    Assistant:  Surgical Assistant: Eveline Magana    Anesthesia: General    Estimated Blood Loss (mL): less than 50     Complications: None    Specimens:   * No specimens in log *    Implants:  Implant Name Type Inv. Item Serial No.  Lot No. LRB No. Used Action   K WIRE FIX L150MM DIA1.2MM MIDFT HD THRD DISP FOR ORTHOLOC - [id number]  K WIRE FIX L150MM DIA1.2MM MIDFT HD THRD DISP FOR ORTHOLOC  [employer] [facility]-  Left 2 Explanted   WIRE FIX DIA1.20MM NTHRD K - [id number]  WIRE FIX DIA1.20MM NTHRD K  [employer] [facility]  Left 1 Explanted   SCREW HEADED 4.0X50MM - [id number]  SCREW HEADED 4.0X50MM  [employer] [facility]  Left 2 Implanted   SCREW HEADLESS 3.0X32MM - [id number]  SCREW HEADLESS 3.0X32MM  [employer] [facility]  Left 2 Implanted         Drains: * No LDAs found *    Findings: syndesmosis was stable.    Electronically signed by Naeem Valdez DPM on 3/3/2023 at 10:37 AM

## 2023-03-03 NOTE — ANESTHESIA PRE PROCEDURE
Department of Anesthesiology  Preprocedure Note       Name:  Taras Sun   Age:  34 y.o.  :  1990                                          MRN:  [id number]         Date:  3/3/2023      Surgeon: Frandy Juarez):  Tatiana Quintero DPM    Procedure: Procedure(s):  MEDIAL MALLEOLUS FRACTURE OPEN REDUCTION INTERNAL FIXATION LEFT ANKLE, POSSIBLE POSTERIOR MALLEOLUS FRACTURE OPEN REDUCTION INTERNAL FIXATION NOTE: SCIATIC NERVE BLOCK                   SHANTEL    Medications prior to admission:   Prior to Admission medications    Medication Sig Start Date End Date Taking? Authorizing Provider   acetaminophen (TYLENOL) 500 MG tablet Take 500 mg by mouth every 6 hours as needed for Pain   Yes Historical Provider, MD   methylphenidate (CONCERTA) 18 MG extended release tablet Take 1 tablet by mouth every morning for 30 days.  2/13/23 3/15/23  Tyler Abbasi DO   clindamycin (CLEOCIN T) 1 % lotion Apply to affected areas where you flare daily  Patient not taking: No sig reported 22   Gunnar Hewitt MD   sildenafil (VIAGRA) 50 MG tablet Take 1 tablet by mouth as needed for Erectile Dysfunction 22   Tyler Abbasi DO       Current medications:    Current Facility-Administered Medications   Medication Dose Route Frequency Provider Last Rate Last Admin    ceFAZolin (ANCEF) 2000 mg in 0.9% sodium chloride 100 mL IVPB  2,000 mg IntraVENous Once Tataina Quintero DPM        lidocaine PF 1 % injection 1 mL  1 mL IntraDERmal Once PRN Chanell Santos MD        sodium chloride flush 0.9 % injection 5-40 mL  5-40 mL IntraVENous 2 times per day Chanell Santos MD        sodium chloride flush 0.9 % injection 5-40 mL  5-40 mL IntraVENous PRN Chanell Santos MD        0.9 % sodium chloride infusion   IntraVENous PRN Chanell Santos MD        lactated ringers IV soln infusion   IntraVENous Continuous Chanell Santos  mL/hr at 23 0702 New Bag at 23 0702       Allergies:  No Known Allergies    Problem List:    Patient Active Problem List   Diagnosis Code    Attention deficit disorder (ADD) without hyperactivity F98.8       Past Medical History:        Diagnosis Date    ADHD (attention deficit hyperactivity disorder)        Past Surgical History:  History reviewed. No pertinent surgical history. Social History:    Social History     Tobacco Use    Smoking status: Never    Smokeless tobacco: Never   Substance Use Topics    Alcohol use: Yes     Alcohol/week: 2.0 standard drinks     Types: 2 Cans of beer per week                                Counseling given: Not Answered      Vital Signs (Current):   Vitals:    02/28/23 0831 03/03/23 0622   BP:  (!) 141/90   Pulse:  69   Resp:  16   Temp:  97 °F (36.1 °C)   TempSrc:  Temporal   SpO2:  98%   Weight: 215 lb (97.5 kg) 215 lb (97.5 kg)   Height: 6' 2\" (1.88 m) 6' 2\" (1.88 m)                                              BP Readings from Last 3 Encounters:   03/03/23 (!) 141/90   03/02/23 116/78   10/05/22 112/72       NPO Status: Time of last liquid consumption: 2330                        Time of last solid consumption: 2000                        Date of last liquid consumption: 03/02/23                        Date of last solid food consumption: 03/02/23    BMI:   Wt Readings from Last 3 Encounters:   03/03/23 215 lb (97.5 kg)   03/02/23 214 lb 6.4 oz (97.3 kg)   10/05/22 212 lb (96.2 kg)     Body mass index is 27.6 kg/m².     CBC:   Lab Results   Component Value Date/Time    WBC 8.9 09/17/2020 12:31 PM    RBC 5.18 09/17/2020 12:31 PM    HGB 15.6 09/17/2020 12:31 PM    HCT 47.3 09/17/2020 12:31 PM    MCV 91.3 09/17/2020 12:31 PM    RDW 12.8 09/17/2020 12:31 PM     09/17/2020 12:31 PM       CMP:   Lab Results   Component Value Date/Time     09/17/2020 12:31 PM    K 5.7 09/17/2020 12:31 PM     09/17/2020 12:31 PM    CO2 25 09/17/2020 12:31 PM    BUN 18 09/17/2020 12:31 PM    CREATININE 1.1 09/17/2020 12:31 PM    GFRAA >60 09/17/2020 12:31 PM    AGRATIO 2.0 09/17/2020 12:31 PM    LABGLOM >60 09/17/2020 12:31 PM    GLUCOSE 87 09/17/2020 12:31 PM    PROT 7.3 09/17/2020 12:31 PM    CALCIUM 10.1 09/17/2020 12:31 PM    BILITOT 0.7 09/17/2020 12:31 PM    ALKPHOS 47 09/17/2020 12:31 PM    AST 25 09/17/2020 12:31 PM    ALT 23 09/17/2020 12:31 PM       POC Tests: No results for input(s): POCGLU, POCNA, POCK, POCCL, POCBUN, POCHEMO, POCHCT in the last 72 hours. Coags: No results found for: PROTIME, INR, APTT    HCG (If Applicable): No results found for: PREGTESTUR, PREGSERUM, HCG, HCGQUANT     ABGs: No results found for: PHART, PO2ART, KFG3TCT, CRN8SGP, BEART, Y4KHLZNK     Type & Screen (If Applicable):  No results found for: LABABO, LABRH    Drug/Infectious Status (If Applicable):  No results found for: HIV, HEPCAB    COVID-19 Screening (If Applicable): No results found for: COVID19        Anesthesia Evaluation    Airway: Mallampati: I          Dental: normal exam         Pulmonary:Negative Pulmonary ROS breath sounds clear to auscultation                             Cardiovascular:Negative CV ROS            Rhythm: regular  Rate: normal                    Neuro/Psych:   Negative Neuro/Psych ROS              GI/Hepatic/Renal: Neg GI/Hepatic/Renal ROS            Endo/Other: Negative Endo/Other ROS                    Abdominal:             Vascular: negative vascular ROS. Other Findings:           Anesthesia Plan      general and regional     ASA 2     (Pt agrees to risks. Benefits and options of GETA. Questions answered. Willing to proceed.)  Induction: intravenous. Anesthetic plan and risks discussed with patient.                         Lyssa Tapia MD   3/3/2023

## 2023-03-03 NOTE — ANESTHESIA PROCEDURE NOTES
Peripheral Block    Patient location during procedure: holding area  Reason for block: post-op pain management and at surgeon's request  Start time: 3/3/2023 7:24 AM  End time: 3/3/2023 7:35 AM  Staffing  Anesthesiologist: Gama Riley MD  Peripheral Block   Patient position: right lateral decubitus  Prep: ChloraPrep  Provider prep: sterile gloves  Block type: Sciatic  Popliteal  Laterality: left  Injection technique: single-shot  Guidance: ultrasound guided    Needle   Needle type: short-bevel   Needle gauge: 21 G  Needle localization: ultrasound guidance  Needle length: 8 cm  Assessment   Injection assessment: negative aspiration for heme, no paresthesia on injection, local visualized surrounding nerve on ultrasound and no intravascular symptoms  Paresthesia pain: none  Slow fractionated injection: yes  Hemodynamics: stable  Real-time US image taken/store: yes  Outcomes: patient tolerated procedure well    Medications Administered  bupivacaine (PF) 0.5 % - Perineural   20 mL - 3/3/2023 7:24:00 AM

## 2023-03-04 NOTE — OP NOTE
[facility]                                OPERATIVE REPORT    PATIENT NAME: Benny Leonard                        :        1990  MED REC NO:   [id number]                          ROOM:  ACCOUNT NO:   [de-identified]                           ADMIT DATE: 2023  PROVIDER:     Maylin Wells DPM    DATE OF PROCEDURE:  2023    PREOPERATIVE DIAGNOSES:  1. Medial malleolar fractured left ankle. 2.  Posterior malleolus fracture, left ankle. POSTOPERATIVE DIAGNOSES:  1. Medial malleolar fractured left ankle. 2.  Posterior malleolus fracture, left ankle. OPERATION PERFORMED:  1. Medial malleolar fracture open reduction and internal fixation, left  ankle. 2.  Posterior malleolus fracture open reduction and internal fixation,  left ankle. SURGEON:  Maylin Wells DPM    ANESTHESIA:  General with sciatic nerve block. OPERATIVE INDICATIONS:  This patient presented to my outpatient clinic  this past Monday with severe left ankle pain. He was snowboarding in  [facility] over the weekend and actually fractured the ankle on Friday. He  proceeded to snowboard throughout the weekend despite the pain. Upon  return to the area he was initially evaluated in my office. Subsequent  x-rays demonstrated a medial malleolus fracture with mild displacement  and mild widening of the medial gutter. There was a great suspicion for  a posterior malleolar fracture, but not well demonstrated on the x-ray. I have sent him for a CT, which confirmed the posterior malleolar  fracture. He was scheduled for surgery to fix the ankle fracture. OPERATIVE PROCEDURE:  The patient was brought to the operating room and  placed in a prone position on the operating table. Appropriate padding  was applied by the nursing staff.   He received a preoperative sciatic  nerve block in preoperative holding from the Anesthesia Department of  [facility]. They also administered general  anesthesia in the operating room. The patient's left lower extremity  was prepped and draped in the usual aseptic manner. The lower extremity  was exsanguinated and a pneumatic tourniquet at the left thigh was  inflated for hemostasis. Attention was then directed to the  posterolateral aspect of the left ankle. No signs of infection were  present. There were no open lesions present. A linear incision was  placed over the posterolateral aspect of the left ankle just lateral to  the Achilles tendon. This was deepened down to the level of the  posterior joint capsule on the ankle. All neurovascular structures are  carefully identified and they were either bovied or retracted. A  capsular incision was made delivering the posterior aspect of the ankle  joint in to view as well as the fracture. A Key elevator was used to  reflect the periosteum over the fracture site. At this point, I noted  proximally 1 mm of posterior displacement of the fracture. The ankle  joint was explored and small osseous fragments were debrided. I then  lightly distracted the ankle and manipulated the fracture fragment back  into anatomic alinement. My initial plan for fixation was to apply a  posterior buttress plate, however due to the configuration of the  fracture and alignment of the screw holes, this was not going to work. I abandoned the idea of the plate and then inserted two K-wires from  posterior to anterior across the fracture site. Lateral view  demonstrated excellent placement of the both screws. I then inserted a  4.0 mm x 15 mm headed screw into the lateral portion of the fracture and  a second 4.0 mm x 15 mm headed screw into the medial portion of the  posterior malleolar fracture. Mini C-arm demonstrated anatomic  alignment of the fracture on the lateral and the AP views.   The purchase  of each screw into the bone was felt to be excellent and this provided  excellent compression. There was no palpable step-off at the fracture  line. At this point, the wound was copiously irrigated with normal  saline. The periosteal tissue was repaired using 2-0 Vicryl. The skin  was repaired using 3-0 Vicryl and 4-0 Monocryl. A ZIPLine was placed  over the incision. Tegaderm was then applied over the postop dressing  and the surgical drapes were then removed. The patient was then repositioned on a second table in a supine  position. The tourniquet was deflated for this. The patient's left  lower extremity was then prepped and draped in the usual aseptic manner  again. The limb was exsanguinated and the same pneumatic tourniquet at  the left thigh was inflated for hemostasis. Attention was then directed  to the medial aspect of the left ankle. Again no fracture, blisters, or  open lesions or signs of infection were noted. A slightly curvilinear  incision was placed in the posteromedial aspect of the ankle over the  fracture site. This was deepened down to the level of the fracture  being careful to avoid all neurovascular structures. They were either  bovied or retracted. The periosteal incision was made delivering the  fracture plainly into view. At this point, I noted approximately 1 mm  of posterior and medial displacement of the fragment. There was mild  medial gutter space opening on the AP view. The fracture line was  clearly identified using [place] elevator. There was minimal comminution  noted. I then manipulated the ankle and the fracture back into  anatomica alignments and typically fixated this with 2 K-wires. I then  used a 3.0 x 32 mm headless screw and 3.0 x 32 mm headless screw with  both converging at the medial malleolar fragments. This provided  excellent compression over the fracture site and the fracture was in  anatomic alignment. There was no palpable step-off at the fracture  line.   Mini C-arm demonstrated anatomic alignment of the fracture  fragment and the ankle joint. The wound was then copiously irrigated  with normal saline. The periosteal tissue was repaired using 2-0  Vicryl. The skin was repaired using 3-0 Vicryl and 4-0 Monocryl. A  ZIPLine was placed over the incision. A mildly compressive dressing was  applied to the patient's left ankle. The tourniquet was released again  and immediate warmth and perfusion was noted to have returned to all  digits of the left foot shortly after. There were no complications  encountered during the procedure. All materials and injectables were as  above. There were no specimens sent to pathology. Estimated blood loss  for the procedure was less than 50 mL.         Rex Lemon DPM    D: 03/03/2023 10:49:46       T: 03/03/2023 12:41:21     [email or web address]  Job#: [id number]     Doc#: [id number]    CC:

## 2023-04-04 DIAGNOSIS — N52.9 VASCULOGENIC ERECTILE DYSFUNCTION, UNSPECIFIED VASCULOGENIC ERECTILE DYSFUNCTION TYPE: ICD-10-CM

## 2023-04-04 RX ORDER — SILDENAFIL 50 MG/1
50 TABLET, FILM COATED ORAL PRN
Qty: 30 TABLET | Refills: 3 | Status: SHIPPED | OUTPATIENT
Start: 2023-04-04

## 2023-04-04 NOTE — TELEPHONE ENCOUNTER
Medication:   Requested Prescriptions     Pending Prescriptions Disp Refills    sildenafil (VIAGRA) 50 MG tablet 30 tablet 3     Sig: Take 1 tablet by mouth as needed for Erectile Dysfunction        Last Filled:    09/27/22  Patient Phone Number: [phone] (home)     Last appt: 3/2/2023     Return in about 6 months (around 4/5/2023) for ADD/ADHD. Next appt: Visit date not found    Last OARRS: No flowsheet data found.

## 2023-04-19 ENCOUNTER — HOSPITAL ENCOUNTER (OUTPATIENT)
Dept: PHYSICAL THERAPY | Age: 33
Setting detail: THERAPIES SERIES
Discharge: HOME | End: 2023-04-19
Payer: COMMERCIAL

## 2023-04-19 PROCEDURE — 97161 PT EVAL LOW COMPLEX 20 MIN: CPT | Performed by: PHYSICAL THERAPIST

## 2023-04-19 PROCEDURE — 97110 THERAPEUTIC EXERCISES: CPT | Performed by: PHYSICAL THERAPIST

## 2023-04-19 NOTE — THERAPY EVALUATION
on it, but the pain became worse as he was traveling back. Reports that when he came home he got x rays which showed fracture in his ankle. Had surgery on March 3 and was NWB for about 5 weeks. Saw MD today who told him to start PT. He is WBAT in the boot for 2 more weeks when he has his next follow up. Pain located on the outside of his ankle. Relevant Medical History:no significant PMH  Functional Disability Index: LEFS 14/80 (83%)    Height: 6'2 Weight: 215  Pain Scale: 0-4/10  Easing factors: Tylenol  Provocative factors: walking or putting weight on foot without boot     Type: []Constant   []Intermittent  []Radiating []Localized []other:     Numbness/Tingling: occasional tingling in toes    Occupation/School: works at [employer] - physical job that includes lifting and walking    Living Status/Prior Level of Function: Independent with ADLs and IADLs, prior to injury was doing crossfit 3-4x/wk,     OBJECTIVE:     ROM LEFT RIGHT   HIP Flex     HIP Abd     HIP Ext     HIP IR     HIP ER     Knee ext     Knee Flex     Ankle PF 28    Ankle DF 0    Ankle In 10    Ankle Ev 0    Strength  LEFT RIGHT   HIP Flexors     HIP Abductors     HIP Ext     Hip ER     Knee EXT (quad)     Knee Flex (HS)     Ankle DF DNT    Ankle PF DNT    Ankle Inv DNT    Ankle EV DNT         Circumference  Mid apex  7 cm prox             Reflexes/Sensation: DNT this date   []Dermatomes/Myotomes intact    []Reflexes equal and normal bilaterally   []Other:    Joint mobility:    []Normal    [x]Hypo due to edema   []Hyper    Palpation: TTP plantar surface of calcaneus    Functional Mobility/Transfers: independent    Posture: fair    Bandages/Dressings/Incisions: Incisions healed with no signs/symptoms of infection. Gait: (include devices/WB status) ambulating with walking boot with LE in ER and decreased stance time LLE    Orthopedic Special Tests: None this date                       [x] Patient history, allergies, meds reviewed.  Medical

## 2023-04-19 NOTE — FLOWSHEET NOTE
Strength   []  Stage 2: Return to Run and Strength   []  Stage 3: Return to Jump and Strength   []  Stage 4: Dynamic Strength and Agility   []  Stage 5: Sport Specific Training     []  Ready to Return to Play, Meets All Above Stages   []  Not Ready for Return to Sports   Comments:                         PLAN: See eval  [] Continue per plan of care [] Alter current plan (see comments above)  [x] Plan of care initiated [] Hold pending MD visit [] Discharge      Electronically signed by:  Angela Lane PT, DPT [id number]     Note: If patient does not return for scheduled/ recommended follow up visits, this note will serve as a discharge from care along with most recent update on progress.

## 2023-04-24 ENCOUNTER — HOSPITAL ENCOUNTER (OUTPATIENT)
Dept: PHYSICAL THERAPY | Age: 33
Setting detail: THERAPIES SERIES
Discharge: HOME | End: 2023-04-24
Payer: COMMERCIAL

## 2023-04-24 PROCEDURE — 97110 THERAPEUTIC EXERCISES: CPT | Performed by: PHYSICAL THERAPIST

## 2023-04-24 PROCEDURE — 97140 MANUAL THERAPY 1/> REGIONS: CPT | Performed by: PHYSICAL THERAPIST

## 2023-04-24 NOTE — FLOWSHEET NOTE
The [address], [address] [facility], [address] [place], [place]    Physical Therapy Treatment Note/ Progress Report:           Date:  2023    Patient Name:  Toney Sanchez    :  1990  MRN: [id number]  Restrictions/Precautions:    Medical/Treatment Diagnosis Information:   S/P left medial and posteior malleolus ORIF DOS 3/3/23           Treatment Diagnosis: Left ankle pain (M25.572), difficulty walking (W33.2)  Insurance/Certification information:   [facility]  Physician Information:   Dr. Roxanne Osullivan  Has the plan of care been signed (Y/N):        []  Yes  [x]  No     Date of Patient follow up with Physician: 5/3/23      Is this a Progress Report:     []  Yes  [x]  No        If Yes:  Date Range for reporting period:  Beginnin23  Ending    Progress report will be due (10 Rx or 30 days whichever is less): 40       Recertification will be due (POC Duration  / 90 days whichever is less):       Visit # Insurance Allowable Auth Required   In-person 2 12 visits [x]  Yes []  No    Telehealth   []  Yes []  No    Total          Functional Scale: LEFS 14/80 (83%)    Date assessed:  23      Therapy Diagnosis/Practice Pattern:G      Number of Comorbidities:  []0     []1-2    []3+    Latex Allergy:  [x]NO      []YES  Preferred Language for Healthcare:   [x]English       []other:      Pain level:  0/10     SUBJECTIVE:  Pt reports his L ankle is feeling better and his HEP is going well. He stated he left his bands in [place].     OBJECTIVE:   Observation: Increased tissue tension of L gastroc musculature  Test measurements: NT on this date      RESTRICTIONS/PRECAUTIONS: WBAT in the boot until next MD appointment (5/3/23)    Exercises/Interventions:     Therapeutic Ex (28474) and NMR re-education (77445)  Sets/sec Reps Notes/CUES   Gastroc stretch 30\" 3x    Hamstring stretch tableside 30\" 3x                SL hip abduction 2 10 R/L    INV/EV ballet disc 5\" 10 ea

## 2023-04-26 ENCOUNTER — HOSPITAL ENCOUNTER (OUTPATIENT)
Dept: PHYSICAL THERAPY | Age: 33
Setting detail: THERAPIES SERIES
Discharge: HOME | End: 2023-04-26
Payer: COMMERCIAL

## 2023-04-26 PROCEDURE — 97140 MANUAL THERAPY 1/> REGIONS: CPT | Performed by: PHYSICAL THERAPIST

## 2023-04-26 PROCEDURE — 97110 THERAPEUTIC EXERCISES: CPT | Performed by: PHYSICAL THERAPIST

## 2023-04-26 NOTE — FLOWSHEET NOTE
The [address] [facility],[address], [address] [facility], [address] [place], [place]    Physical Therapy Treatment Note/ Progress Report:           Date:  2023    Patient Name:  Kendall Hackett    :  1990  MRN: [id number]  Restrictions/Precautions:    Medical/Treatment Diagnosis Information:   S/P left medial and posteior malleolus ORIF DOS 3/3/23           Treatment Diagnosis: Left ankle pain (M25.572), difficulty walking (V45.6)  Insurance/Certification information:   [facility]  Physician Information:   Dr. Shawnee Barber  Has the plan of care been signed (Y/N):        []  Yes  [x]  No     Date of Patient follow up with Physician: 5/3/23      Is this a Progress Report:     []  Yes  [x]  No        If Yes:  Date Range for reporting period:  Beginnin23  Ending    Progress report will be due (10 Rx or 30 days whichever is less): 3/81/98       Recertification will be due (POC Duration  / 90 days whichever is less):  12 weeks     Visit # Insurance Allowable Auth Required   In-person 3 12 visits [x]  Yes []  No    Telehealth   []  Yes []  No    Total          Functional Scale: LEFS 14/80 (83%)    Date assessed:  23      Therapy Diagnosis/Practice Pattern:G      Number of Comorbidities:  []0     []1-2    []3+    Latex Allergy:  [x]NO      []YES  Preferred Language for Healthcare:   [x]English       []other:      Pain level:  0/10 in boot     SUBJECTIVE:  Pt reports his L ankle is feeling good and he went swimming yesterday. He states breast stroke was difficult. He continues to have pain in L heel if he puts weight on it. He will be seeing doctor next Wednesday.     OBJECTIVE:   Observation:   Test measurements: NT on this date      RESTRICTIONS/PRECAUTIONS: WBAT in the boot until next MD appointment (5/3/23)    Exercises/Interventions:     Therapeutic Ex (47393) and NMR re-education (92889)  Sets/sec Reps Notes/CUES   Gastroc stretch 30\" 3x                Towel scrunches  20x

## 2023-05-01 ENCOUNTER — HOSPITAL ENCOUNTER (OUTPATIENT)
Dept: PHYSICAL THERAPY | Age: 33
Setting detail: THERAPIES SERIES
Discharge: HOME | End: 2023-05-01
Payer: COMMERCIAL

## 2023-05-01 PROCEDURE — 97140 MANUAL THERAPY 1/> REGIONS: CPT | Performed by: PHYSICAL THERAPIST

## 2023-05-01 PROCEDURE — 97112 NEUROMUSCULAR REEDUCATION: CPT | Performed by: PHYSICAL THERAPIST

## 2023-05-01 PROCEDURE — 97110 THERAPEUTIC EXERCISES: CPT | Performed by: PHYSICAL THERAPIST

## 2023-05-01 NOTE — FLOWSHEET NOTE
The [address], [address] [facility], [address] [place], [place]    Physical Therapy Treatment Note/ Progress Report:           Date:  2023    Patient Name:  Ambrosio Sinclair    :  1990  MRN: [id number]  Restrictions/Precautions:    Medical/Treatment Diagnosis Information:   S/P left medial and posteior malleolus ORIF DOS 3/3/23           Treatment Diagnosis: Left ankle pain (M25.572), difficulty walking (A52.8)  Insurance/Certification information:   [facility]  Physician Information:   Dr. Mikie Barnett  Has the plan of care been signed (Y/N):        []  Yes  [x]  No (Faxed  and )    Date of Patient follow up with Physician: 5/3/23      Is this a Progress Report:     []  Yes  [x]  No        If Yes:  Date Range for reporting period:  Beginnin23  Ending    Progress report will be due (10 Rx or 30 days whichever is less): 31       Recertification will be due (POC Duration  / 90 days whichever is less):  12 weeks     Visit # Insurance Allowable Auth Required   In-person 4 12 visits [x]  Yes []  No    Telehealth   []  Yes []  No    Total          Functional Scale: LEFS 14/80 (83%)    Date assessed:  23      Therapy Diagnosis/Practice Pattern:G      Number of Comorbidities:  [x]0     []1-2    []3+    Latex Allergy:  [x]NO      []YES  Preferred Language for Healthcare:   [x]English       []other:      Pain level:  0/10 in boot     SUBJECTIVE:  Pt reports his L ankle is feeling better and his heel pain improve after stretching his calf this morning. He is anxious to get out of the boot.     OBJECTIVE:   Observation:   Test measurements: NT on this date      RESTRICTIONS/PRECAUTIONS: WBAT in the boot until next MD appointment (5/3/23)    Exercises/Interventions:     Therapeutic Ex (34318) and NMR re-education (91041)  Sets/sec Reps Notes/CUES   Gastroc stretch 30\" 3x                Towel scrunches  20x       SL hip abduction 2 10 R/L    INV/EV ballet

## 2023-05-03 ENCOUNTER — HOSPITAL ENCOUNTER (OUTPATIENT)
Dept: PHYSICAL THERAPY | Age: 33
Setting detail: THERAPIES SERIES
Discharge: HOME | End: 2023-05-03
Payer: COMMERCIAL

## 2023-05-03 PROCEDURE — 97112 NEUROMUSCULAR REEDUCATION: CPT | Performed by: PHYSICAL THERAPIST

## 2023-05-03 PROCEDURE — 97110 THERAPEUTIC EXERCISES: CPT | Performed by: PHYSICAL THERAPIST

## 2023-05-03 PROCEDURE — 97140 MANUAL THERAPY 1/> REGIONS: CPT | Performed by: PHYSICAL THERAPIST

## 2023-05-03 NOTE — FLOWSHEET NOTE
fatigue  [] Patient limited by pain    [] Patient limited by other medical complications  [] Other:     ASSESSMENT: Pt tolerated standing progressions well with min to mod cueing. He demonstrated multiple deep squats and was informed to hold these type of exercises. Pt is anxious to return to his previous exercise regimen and PLOF, however he was informed on the importance of reasonable progression. SLB and standing hip abduction were added to his HEP. Return to Play: (if applicable)   [x]  Stage 1: Intro to Strength   []  Stage 2: Return to Run and Strength   []  Stage 3: Return to Jump and Strength   []  Stage 4: Dynamic Strength and Agility   []  Stage 5: Sport Specific Training     []  Ready to Return to Play, Meets All Above Stages   []  Not Ready for Return to Sports   Comments:                         PLAN: See eval  [x] Continue per plan of care [] Alter current plan (see comments above)  [] Plan of care initiated [] Hold pending MD visit [] Discharge      Electronically signed by:  Sean Olmedo PT, DPT [address], SPT. Therapist was present, directed the patient's care, made skilled judgement, and was responsible for assessment and treatment of the patient. Note: If patient does not return for scheduled/ recommended follow up visits, this note will serve as a discharge from care along with most recent update on progress.

## 2023-05-08 ENCOUNTER — HOSPITAL ENCOUNTER (OUTPATIENT)
Dept: PHYSICAL THERAPY | Age: 33
Setting detail: THERAPIES SERIES
Discharge: HOME | End: 2023-05-08
Payer: COMMERCIAL

## 2023-05-08 PROCEDURE — 97112 NEUROMUSCULAR REEDUCATION: CPT | Performed by: PHYSICAL THERAPIST

## 2023-05-08 PROCEDURE — 97140 MANUAL THERAPY 1/> REGIONS: CPT | Performed by: PHYSICAL THERAPIST

## 2023-05-08 PROCEDURE — 97110 THERAPEUTIC EXERCISES: CPT | Performed by: PHYSICAL THERAPIST

## 2023-05-08 NOTE — FLOWSHEET NOTE
The [address], [address] [facility], [address] [place], [place]    Physical Therapy Treatment Note/ Progress Report:           Date:  2023    Patient Name:  Nara Calixto    :  1990  MRN: [id number]  Restrictions/Precautions:    Medical/Treatment Diagnosis Information:   S/P left medial and posteior malleolus ORIF DOS 3/3/23           Treatment Diagnosis: Left ankle pain (M25.572), difficulty walking (R41.0)  Insurance/Certification information:   [facility]  Physician Information:   Dr. Poncho Ku  Has the plan of care been signed (Y/N):        []  Yes  [x]  No (Faxed  and )    Date of Patient follow up with Physician: 5/3/23      Is this a Progress Report:     []  Yes  [x]  No        If Yes:  Date Range for reporting period:  Beginnin23  Ending    Progress report will be due (10 Rx or 30 days whichever is less): 33       Recertification will be due (POC Duration  / 90 days whichever is less):  12 weeks     Visit # Insurance Allowable Auth Required   In-person 5 12 visits [x]  Yes []  No    Telehealth   []  Yes []  No    Total          Functional Scale: LEFS 14/80 (83%)    Date assessed:  23      Therapy Diagnosis/Practice Pattern:G      Number of Comorbidities:  [x]0     []1-2    []3+    Latex Allergy:  [x]NO      []YES  Preferred Language for Healthcare:   [x]English       []other:      Pain level:  6-7/10      SUBJECTIVE:  Patient reports his ankle is painful today, likely from being back at work. Reports that when he sat and rested, the pain decreased.     OBJECTIVE:   Observation:   Test measurements: NT on this date      RESTRICTIONS/PRECAUTIONS: WBAT with [employer]     Exercises/Interventions:     Therapeutic Ex (03036) and NMR re-education (94812)  Sets/sec Reps Notes/CUES      Hamstring stretch tableside 30\" 3x    Incline stretch gastroc and soleus 30\" 3x ea       Reformer walking 2 10 2R   Reformer squats feet neutral, ER 1 15x

## 2023-05-10 ENCOUNTER — HOSPITAL ENCOUNTER (OUTPATIENT)
Dept: PHYSICAL THERAPY | Age: 33
Setting detail: THERAPIES SERIES
Discharge: HOME | End: 2023-05-10
Payer: COMMERCIAL

## 2023-05-10 ENCOUNTER — TELEPHONE (OUTPATIENT)
Dept: PRIMARY CARE CLINIC | Age: 33
End: 2023-05-10

## 2023-05-10 DIAGNOSIS — Z00.00 ROUTINE GENERAL MEDICAL EXAMINATION AT A HEALTH CARE FACILITY: Primary | ICD-10-CM

## 2023-05-10 PROCEDURE — 97112 NEUROMUSCULAR REEDUCATION: CPT | Performed by: PHYSICAL THERAPIST

## 2023-05-10 PROCEDURE — 97110 THERAPEUTIC EXERCISES: CPT | Performed by: PHYSICAL THERAPIST

## 2023-05-10 PROCEDURE — 97140 MANUAL THERAPY 1/> REGIONS: CPT | Performed by: PHYSICAL THERAPIST

## 2023-05-10 NOTE — FLOWSHEET NOTE
The [address], [address], [address] [place], [place]    Physical Therapy Treatment Note/ Progress Report:           Date:  5/10/2023    Patient Name:  Michelle Gan    :  1990  MRN: [id number]  Restrictions/Precautions:    Medical/Treatment Diagnosis Information:   S/P left medial and posteior malleolus ORIF DOS 3/3/23           Treatment Diagnosis: Left ankle pain (M25.572), difficulty walking (U56.3)  Insurance/Certification information:   [facility]  Physician Information:   Dr. Sidney Kaminski  Has the plan of care been signed (Y/N):        []  Yes  [x]  No (Faxed  and )    Date of Patient follow up with Physician: 5/3/23      Is this a Progress Report:     []  Yes  [x]  No        If Yes:  Date Range for reporting period:  Beginnin23  Ending    Progress report will be due (10 Rx or 30 days whichever is less):        Recertification will be due (POC Duration  / 90 days whichever is less):  12 weeks     Visit # Insurance Allowable Auth Required   In-person 6 12 visits [x]  Yes []  No    Telehealth   []  Yes []  No    Total          Functional Scale: LEFS 14/80 (83%)    Date assessed:  23      Therapy Diagnosis/Practice Pattern:G      Number of Comorbidities:  [x]0     []1-2    []3+    Latex Allergy:  [x]NO      []YES  Preferred Language for Healthcare:   [x]English       []other:      Pain level:  6-7/10      SUBJECTIVE:  Patient reports his ankle gets very sore by the end of the work day. Heel hurts a lot first thing in the morning but feels better after a hot shower. Trying not to walk a lot at work if he can avoid it. Iced yesterday.     OBJECTIVE:   Observation:   Test measurements: PN NV    RESTRICTIONS/PRECAUTIONS: WBAT with [employer]     Exercises/Interventions:     Therapeutic Ex (65122) and NMR re-education (42844)  Sets/sec Reps Notes/CUES         Incline stretch gastroc and soleus 30\" 3x ea       Reformer walking 2 10 2R,

## 2023-05-11 DIAGNOSIS — Z13.1 SCREENING FOR DIABETES MELLITUS: ICD-10-CM

## 2023-05-11 DIAGNOSIS — Z13.220 SCREENING FOR HYPERLIPIDEMIA: ICD-10-CM

## 2023-05-11 DIAGNOSIS — Z11.3 SCREEN FOR STD (SEXUALLY TRANSMITTED DISEASE): ICD-10-CM

## 2023-05-11 DIAGNOSIS — Z11.59 ENCOUNTER FOR HEPATITIS C SCREENING TEST FOR LOW RISK PATIENT: ICD-10-CM

## 2023-05-11 DIAGNOSIS — Z11.4 SCREENING FOR HIV (HUMAN IMMUNODEFICIENCY VIRUS): ICD-10-CM

## 2023-05-11 LAB
CHOLEST SERPL-MCNC: 183 MG/DL (ref 0–199)
EST. AVERAGE GLUCOSE BLD GHB EST-MCNC: 105.4 MG/DL
HBA1C MFR BLD: 5.3 %
HCV AB SERPL QL IA: NORMAL
HDLC SERPL-MCNC: 58 MG/DL (ref 40–60)
LDL CHOLESTEROL CALCULATED: 111 MG/DL
TRIGL SERPL-MCNC: 68 MG/DL (ref 0–150)
VLDLC SERPL CALC-MCNC: 14 MG/DL

## 2023-05-12 ENCOUNTER — TRANSCRIBE ORDERS (OUTPATIENT)
Dept: PRIMARY CARE CLINIC | Age: 33
End: 2023-05-12

## 2023-05-12 DIAGNOSIS — Z00.00 ROUTINE GENERAL MEDICAL EXAMINATION AT A HEALTH CARE FACILITY: Primary | ICD-10-CM

## 2023-05-12 DIAGNOSIS — Z00.00 ROUTINE GENERAL MEDICAL EXAMINATION AT A HEALTH CARE FACILITY: ICD-10-CM

## 2023-05-12 LAB
ANION GAP SERPL CALCULATED.3IONS-SCNC: 16 MMOL/L (ref 3–16)
BUN SERPL-MCNC: 28 MG/DL (ref 7–20)
C TRACH DNA UR QL NAA+PROBE: NEGATIVE
CALCIUM SERPL-MCNC: 9.9 MG/DL (ref 8.3–10.6)
CHLORIDE SERPL-SCNC: 102 MMOL/L (ref 99–110)
CO2 SERPL-SCNC: 22 MMOL/L (ref 21–32)
CREAT SERPL-MCNC: 1.3 MG/DL (ref 0.9–1.3)
GFR SERPLBLD CREATININE-BSD FMLA CKD-EPI: >60 ML/MIN/{1.73_M2}
GLUCOSE SERPL-MCNC: 94 MG/DL (ref 70–99)
N GONORRHOEA DNA UR QL NAA+PROBE: NEGATIVE
POTASSIUM SERPL-SCNC: 5.5 MMOL/L (ref 3.5–5.1)
SODIUM SERPL-SCNC: 140 MMOL/L (ref 136–145)

## 2023-05-13 LAB
HIV 1+2 AB+HIV1 P24 AG SERPL QL IA: NORMAL
HIV 2 AB SERPL QL IA: NORMAL
HIV1 AB SERPL QL IA: NORMAL
HIV1 P24 AG SERPL QL IA: NORMAL

## 2023-05-15 ENCOUNTER — HOSPITAL ENCOUNTER (OUTPATIENT)
Dept: PHYSICAL THERAPY | Age: 33
Setting detail: THERAPIES SERIES
Discharge: HOME | End: 2023-05-15
Payer: COMMERCIAL

## 2023-05-15 PROCEDURE — 97110 THERAPEUTIC EXERCISES: CPT | Performed by: PHYSICAL THERAPIST

## 2023-05-15 PROCEDURE — 97140 MANUAL THERAPY 1/> REGIONS: CPT | Performed by: PHYSICAL THERAPIST

## 2023-05-15 PROCEDURE — 97112 NEUROMUSCULAR REEDUCATION: CPT | Performed by: PHYSICAL THERAPIST

## 2023-05-15 NOTE — PROGRESS NOTES
weight control with ambulation re-education including up and down stairs     Home Exercise Program:    [x] (04898) Reviewed/Progressed HEP activities related to strengthening, flexibility, endurance, ROM of core, proximal hip and LE for functional self-care, mobility, lifting and ambulation/stair navigation   [] (55735)Reviewed/Progressed HEP activities related to improving balance, coordination, kinesthetic sense, posture, motor skill, proprioception of core, proximal hip and LE for self care, mobility, lifting, and ambulation/stair navigation      Manual Treatments:  PROM / STM / Oscillations-Mobs:  G-I, II, III, IV (PA's, Inf., Post.)  [x] (00484) Provided manual therapy to mobilize LE, proximal hip and/or LS spine soft tissue/joints for the purpose of modulating pain, promoting relaxation,  increasing ROM, reducing/eliminating soft tissue swelling/inflammation/restriction, improving soft tissue extensibility and allowing for proper ROM for normal function with self care, mobility, lifting and ambulation. Modalities:  declined   [] GAME READY (VASO)- for significant edema, swelling, pain control. Charges  Timed Code Treatment Minutes: 39'   Total Treatment Minutes: 39'     [] EVAL (LOW) 68058   [] EVAL (MOD) 85304  [] EVAL (HIGH) 97011   [] RE-EVAL     [x] KQ(32266) x  1   [] IONTO  [x] NMR (43245) x 1    [] VASO  [x] Manual (26905) x  1    [] Other:  [] TA x      [] Mech Traction (69088)  [] ES(attended) (41579)      [] ES (un) (70783):       GOALS:   HEP instruction:   Access Code: [email or web address]  URL: [email or web address]  Date: 04/19/2023  Prepared by: Heather Wesley    GOALS:  Patient stated goal: \"To walk without the boot. To be able to work and exercise. \"  [] Progressing: [] Met: [] Not Met: [] Adjusted    Therapist goals for Patient:   Short Term Goals: To be achieved in: 2 weeks  1. Independent in HEP and progression per patient tolerance, in order to prevent re-injury.    [] Progressing: [x] Met:

## 2023-05-17 ENCOUNTER — HOSPITAL ENCOUNTER (OUTPATIENT)
Dept: PHYSICAL THERAPY | Age: 33
Setting detail: THERAPIES SERIES
Discharge: HOME | End: 2023-05-17
Payer: COMMERCIAL

## 2023-05-17 PROCEDURE — 97112 NEUROMUSCULAR REEDUCATION: CPT | Performed by: PHYSICAL THERAPIST

## 2023-05-17 PROCEDURE — 97110 THERAPEUTIC EXERCISES: CPT | Performed by: PHYSICAL THERAPIST

## 2023-05-17 PROCEDURE — 97140 MANUAL THERAPY 1/> REGIONS: CPT | Performed by: PHYSICAL THERAPIST

## 2023-05-17 NOTE — PROGRESS NOTES
The [address], [address] [facility], [address] [place], [place]    Physical Therapy Treatment Note/ Progress Report:           Date:  2023    Patient Name:  Taya Bishop    :  1990  MRN: [id number]  Restrictions/Precautions:    Medical/Treatment Diagnosis Information:   S/P left medial and posteior malleolus ORIF DOS 3/3/23           Treatment Diagnosis: Left ankle pain (M25.572), difficulty walking (V75.4)  Insurance/Certification information:   [facility]  Physician Information:   Dr. Tiffanie Dumont  Has the plan of care been signed (Y/N):        []  Yes  [x]  No (Faxed , , 5/15)    Date of Patient follow up with Physician: 5/3/23      Is this a Progress Report:     [x]  Yes  []  No        If Yes:  Date Range for reporting period:  Beginnin23  Endin/15/23    Progress report will be due (10 Rx or 30 days whichever is less):        Recertification will be due (POC Duration  / 90 days whichever is less):  12 weeks     Visit # Insurance Allowable Auth Required   In-person 7 12 visits [x]  Yes []  No    Telehealth   []  Yes []  No    Total          Functional Scale: LEFS 54/80 (33%)    Date assessed:  5/15/23      Therapy Diagnosis/Practice Pattern:G      Number of Comorbidities:  [x]0     []1-2    []3+    Latex Allergy:  [x]NO      []YES  Preferred Language for Healthcare:   [x]English       []other:      Pain level:  0-2/10      SUBJECTIVE:  Patient reports that his ankle is doing much better. Reports he feels like he was walking almost normal today. Did some light burpees and hopping. Still swollen but seems less.     OBJECTIVE:   Observation:   Test measurements: DNT this date    RESTRICTIONS/PRECAUTIONS: WBAT with [employer]     Exercises/Interventions:     Therapeutic Ex (07126) and NMR re-education (96298)  Sets/sec Reps Notes/CUES         Incline stretch gastroc and soleus 30\" 3x ea       AROM INV/EV with light OP 10\" 5x ea Stretching, not

## 2023-05-22 ENCOUNTER — HOSPITAL ENCOUNTER (OUTPATIENT)
Dept: PHYSICAL THERAPY | Age: 33
Setting detail: THERAPIES SERIES
Discharge: HOME | End: 2023-05-22
Payer: COMMERCIAL

## 2023-05-22 PROCEDURE — 97110 THERAPEUTIC EXERCISES: CPT | Performed by: PHYSICAL THERAPIST

## 2023-05-22 PROCEDURE — 97112 NEUROMUSCULAR REEDUCATION: CPT | Performed by: PHYSICAL THERAPIST

## 2023-05-22 PROCEDURE — 97140 MANUAL THERAPY 1/> REGIONS: CPT | Performed by: PHYSICAL THERAPIST

## 2023-05-22 NOTE — FLOWSHEET NOTE
The [address], [address], [address] [place], [place]    Physical Therapy Treatment Note/ Progress Report:           Date:  2023    Patient Name:  Breana Cowart    :  1990  MRN: [id number]  Restrictions/Precautions:    Medical/Treatment Diagnosis Information:   S/P left medial and posteior malleolus ORIF DOS 3/3/23           Treatment Diagnosis: Left ankle pain (M25.572), difficulty walking (O48.7)  Insurance/Certification information:   [facility]  Physician Information:   Dr. Amanda Baxter  Has the plan of care been signed (Y/N):        []  Yes  [x]  No (Faxed , , 5/15)    Date of Patient follow up with Physician: 5/3/23      Is this a Progress Report:     []  Yes  [x]  No        If Yes:  Date Range for reporting period:  Beginnin/15/23  Ending:     Progress report will be due (10 Rx or 30 days whichever is less):        Recertification will be due (POC Duration  / 90 days whichever is less):  12 weeks     Visit # Insurance Allowable Auth Required   In-person 8 12 visits [x]  Yes []  No    Telehealth   []  Yes []  No    Total          Functional Scale: LEFS 54/80 (33%)    Date assessed:  5/15/23      Therapy Diagnosis/Practice Pattern:G      Number of Comorbidities:  [x]0     []1-2    []3+    Latex Allergy:  [x]NO      []YES  Preferred Language for Healthcare:   [x]English       []other:      Pain level:  0-2/10      SUBJECTIVE:  Patient reports that he tweaked his ankle on Thursday. Reports that he moved too quickly and pushed off his left foot and felt a sharp twinge. Reports that it feels fine when he walks, but is tender on the outside of his ankle. Heel is still sore.      OBJECTIVE:   Observation:   Test measurements: DNT this date    RESTRICTIONS/PRECAUTIONS: WBAT with [employer]     Exercises/Interventions:     Therapeutic Ex (14081) and NMR re-education (94379)  Sets/sec Reps Notes/CUES         ABCs in sitting  1x    Incline stretch

## 2023-05-24 ENCOUNTER — APPOINTMENT (OUTPATIENT)
Dept: PHYSICAL THERAPY | Age: 33
End: 2023-05-24
Payer: COMMERCIAL

## 2023-05-31 ENCOUNTER — HOSPITAL ENCOUNTER (OUTPATIENT)
Dept: PHYSICAL THERAPY | Age: 33
Setting detail: THERAPIES SERIES
Discharge: HOME | End: 2023-05-31
Payer: COMMERCIAL

## 2023-05-31 PROCEDURE — 97140 MANUAL THERAPY 1/> REGIONS: CPT | Performed by: PHYSICAL THERAPIST

## 2023-05-31 PROCEDURE — 97112 NEUROMUSCULAR REEDUCATION: CPT | Performed by: PHYSICAL THERAPIST

## 2023-05-31 PROCEDURE — 97110 THERAPEUTIC EXERCISES: CPT | Performed by: PHYSICAL THERAPIST

## 2023-05-31 NOTE — FLOWSHEET NOTE
The [address], [address] [facility], [address] [place], [place]    Physical Therapy Treatment Note/ Progress Report:           Date:  2023    Patient Name:  Marifer Agustin    :  1990  MRN: [id number]  Restrictions/Precautions:    Medical/Treatment Diagnosis Information:   S/P left medial and posteior malleolus ORIF DOS 3/3/23           Treatment Diagnosis: Left ankle pain (M25.572), difficulty walking (X88.4)  Insurance/Certification information:   [facility]  Physician Information:   Dr. Remy Caldwell  Has the plan of care been signed (Y/N):        [x]  Yes  []  No (    Date of Patient follow up with Physician: 5/3/23      Is this a Progress Report:     []  Yes  [x]  No        If Yes:  Date Range for reporting period:  Beginnin/15/23  Ending:     Progress report will be due (10 Rx or 30 days whichever is less):  3/41      Recertification will be due (POC Duration  / 90 days whichever is less):  12 weeks     Visit # Insurance Allowable Auth Required   In-person 9 12 visits [x]  Yes []  No    Telehealth   []  Yes []  No    Total          Functional Scale: LEFS 54/80 (33%)    Date assessed:  5/15/23      Therapy Diagnosis/Practice Pattern:G      Number of Comorbidities:  [x]0     []1-2    []3+    Latex Allergy:  [x]NO      []YES  Preferred Language for Healthcare:   [x]English       []other:      Pain level:  0-2/10      SUBJECTIVE:  Patient reports that he saw the doctor last week who said things are going as expected. Told him that his heel pain is plantar fasciitis. Wants him to continue PT for 3 more weeks.       OBJECTIVE:   Observation:   Test measurements: DNT this date    RESTRICTIONS/PRECAUTIONS: WBAT with [employer]Cast     Exercises/Interventions:     Therapeutic Ex (78416) and NMR re-education (58368)  Sets/sec Reps Notes/CUES            Incline stretch gastroc and soleus 30\" 3x ea       2R, 1 B; independent    2R, 1B   Reformer SHR 2 10 2R, 1B independent

## 2023-06-05 ENCOUNTER — HOSPITAL ENCOUNTER (OUTPATIENT)
Dept: PHYSICAL THERAPY | Age: 33
Setting detail: THERAPIES SERIES
Discharge: HOME | End: 2023-06-05
Payer: COMMERCIAL

## 2023-06-05 PROCEDURE — 97112 NEUROMUSCULAR REEDUCATION: CPT | Performed by: PHYSICAL THERAPIST

## 2023-06-05 PROCEDURE — 97110 THERAPEUTIC EXERCISES: CPT | Performed by: PHYSICAL THERAPIST

## 2023-06-05 PROCEDURE — 97140 MANUAL THERAPY 1/> REGIONS: CPT | Performed by: PHYSICAL THERAPIST

## 2023-06-07 ENCOUNTER — HOSPITAL ENCOUNTER (OUTPATIENT)
Dept: PHYSICAL THERAPY | Age: 33
Setting detail: THERAPIES SERIES
Discharge: HOME | End: 2023-06-07
Payer: COMMERCIAL

## 2023-06-07 PROCEDURE — 97110 THERAPEUTIC EXERCISES: CPT | Performed by: PHYSICAL THERAPIST

## 2023-06-07 PROCEDURE — 97112 NEUROMUSCULAR REEDUCATION: CPT | Performed by: PHYSICAL THERAPIST

## 2023-06-07 PROCEDURE — 97140 MANUAL THERAPY 1/> REGIONS: CPT | Performed by: PHYSICAL THERAPIST

## 2023-06-07 NOTE — FLOWSHEET NOTE
with physician  [] Other    Prognosis for POC: [x] Good [] Fair  [] Poor      Patient requires continued skilled intervention: [x] Yes  [] No    Treatment/Activity Tolerance:  [x] Patient able to complete treatment  [] Patient limited by fatigue  [] Patient limited by pain    [] Patient limited by other medical complications  [] Other:     ASSESSMENT: Patient challenged by higher level balance again today. Improved performance on glider and FSU and over. Continues to progress towards goals. Return to Play: (if applicable)   [x]  Stage 1: Intro to Strength   []  Stage 2: Return to Run and Strength   []  Stage 3: Return to Jump and Strength   []  Stage 4: Dynamic Strength and Agility   []  Stage 5: Sport Specific Training     []  Ready to Return to Play, Meets All Above Stages   []  Not Ready for Return to Sports   Comments:                         PLAN: See eval  [x] Continue per plan of care [] Alter current plan (see comments above)  [] Plan of care initiated [] Hold pending MD visit [] Discharge      Electronically signed by:  Krishna Floyd, PT, DPT [id number]       Note: If patient does not return for scheduled/ recommended follow up visits, this note will serve as a discharge from care along with most recent update on progress.

## 2023-06-19 ENCOUNTER — APPOINTMENT (OUTPATIENT)
Dept: PHYSICAL THERAPY | Age: 33
End: 2023-06-19
Payer: COMMERCIAL

## 2024-09-27 ASSESSMENT — PATIENT HEALTH QUESTIONNAIRE - PHQ9
2. FEELING DOWN, DEPRESSED OR HOPELESS: NOT AT ALL
SUM OF ALL RESPONSES TO PHQ QUESTIONS 1-9: 0
SUM OF ALL RESPONSES TO PHQ9 QUESTIONS 1 & 2: 0
SUM OF ALL RESPONSES TO PHQ QUESTIONS 1-9: 0
SUM OF ALL RESPONSES TO PHQ QUESTIONS 1-9: 0
1. LITTLE INTEREST OR PLEASURE IN DOING THINGS: NOT AT ALL
SUM OF ALL RESPONSES TO PHQ QUESTIONS 1-9: 0
SUM OF ALL RESPONSES TO PHQ9 QUESTIONS 1 & 2: 0
2. FEELING DOWN, DEPRESSED OR HOPELESS: NOT AT ALL
1. LITTLE INTEREST OR PLEASURE IN DOING THINGS: NOT AT ALL

## 2024-09-30 ENCOUNTER — TELEPHONE (OUTPATIENT)
Dept: PRIMARY CARE CLINIC | Age: 34
End: 2024-09-30

## 2024-09-30 ENCOUNTER — NURSE ONLY (OUTPATIENT)
Dept: PRIMARY CARE CLINIC | Age: 34
End: 2024-09-30
Payer: COMMERCIAL

## 2024-09-30 DIAGNOSIS — Z23 NEED FOR TETANUS BOOSTER: Primary | ICD-10-CM

## 2024-09-30 DIAGNOSIS — Z23 NEED FOR HEPATITIS A IMMUNIZATION: ICD-10-CM

## 2024-09-30 PROCEDURE — 90715 TDAP VACCINE 7 YRS/> IM: CPT | Performed by: STUDENT IN AN ORGANIZED HEALTH CARE EDUCATION/TRAINING PROGRAM

## 2024-09-30 PROCEDURE — 90471 IMMUNIZATION ADMIN: CPT | Performed by: STUDENT IN AN ORGANIZED HEALTH CARE EDUCATION/TRAINING PROGRAM

## 2024-09-30 PROCEDURE — 90472 IMMUNIZATION ADMIN EACH ADD: CPT | Performed by: STUDENT IN AN ORGANIZED HEALTH CARE EDUCATION/TRAINING PROGRAM

## 2024-09-30 PROCEDURE — 90632 HEPA VACCINE ADULT IM: CPT | Performed by: STUDENT IN AN ORGANIZED HEALTH CARE EDUCATION/TRAINING PROGRAM

## 2024-09-30 RX ORDER — ATOVAQUONE AND PROGUANIL HYDROCHLORIDE 250; 100 MG/1; MG/1
1 TABLET, FILM COATED ORAL DAILY
Qty: 14 TABLET | Refills: 0 | Status: SHIPPED | OUTPATIENT
Start: 2024-09-30 | End: 2024-10-14

## 2024-09-30 NOTE — TELEPHONE ENCOUNTER
Pt would like to know if you can send in malaria pills to the below pharmacy. Pt is going to [place] in [facility] mid October,  Physical scheduled for November 11,   [facility] [id number] - [address]

## 2024-10-23 ENCOUNTER — E-VISIT (OUTPATIENT)
Dept: PRIMARY CARE CLINIC | Age: 34
End: 2024-10-23
Payer: COMMERCIAL

## 2024-10-23 ENCOUNTER — TELEPHONE (OUTPATIENT)
Dept: PRIMARY CARE CLINIC | Age: 34
End: 2024-10-23

## 2024-10-23 DIAGNOSIS — R05.1 ACUTE COUGH: Primary | ICD-10-CM

## 2024-10-23 DIAGNOSIS — R09.82 POST-NASAL DRIP: ICD-10-CM

## 2024-10-23 PROCEDURE — 99421 OL DIG E/M SVC 5-10 MIN: CPT | Performed by: STUDENT IN AN ORGANIZED HEALTH CARE EDUCATION/TRAINING PROGRAM

## 2024-10-23 RX ORDER — METHYLPREDNISOLONE 4 MG/1
TABLET ORAL
Qty: 1 KIT | Refills: 0 | Status: SHIPPED | OUTPATIENT
Start: 2024-10-23 | End: 2024-10-29

## 2024-10-23 RX ORDER — AZITHROMYCIN 250 MG/1
TABLET, FILM COATED ORAL
Qty: 6 TABLET | Refills: 0 | Status: SHIPPED | OUTPATIENT
Start: 2024-10-23 | End: 2024-11-02

## 2024-10-23 ASSESSMENT — LIFESTYLE VARIABLES: SMOKING_STATUS: NO, I'VE NEVER SMOKED

## 2024-10-23 NOTE — PROGRESS NOTES
Diagnoses and all orders for this visit:    Acute cough: Likely postnasal in nature.  Steroids as ordered.  Had staff call and  patient on antibiotics for precautions for use.  Follow-up as needed. > 7 min

## 2024-11-01 ENCOUNTER — OFFICE VISIT (OUTPATIENT)
Dept: PRIMARY CARE CLINIC | Age: 34
End: 2024-11-01

## 2024-11-01 VITALS
WEIGHT: 217.2 LBS | DIASTOLIC BLOOD PRESSURE: 79 MMHG | BODY MASS INDEX: 27.87 KG/M2 | HEIGHT: 74 IN | SYSTOLIC BLOOD PRESSURE: 118 MMHG | HEART RATE: 69 BPM | TEMPERATURE: 97.8 F

## 2024-11-01 DIAGNOSIS — S01.01XA LACERATION OF SCALP, INITIAL ENCOUNTER: Primary | ICD-10-CM

## 2024-11-01 RX ORDER — ISOTRETINOIN 40 MG/1
40 CAPSULE, GELATIN COATED ORAL DAILY
COMMUNITY
Start: 2024-08-16

## 2024-11-01 NOTE — PROGRESS NOTES
Subjective:      Attila Noriega is a 33 y.o. who obtained a laceration 7 days ago, which required closure with 9 suture (s). he denies pain, redness, or drainage from the wound.      Objective:    /79   Pulse 69   Temp 97.8 °F (36.6 °C) (Temporal)   Ht 1.88 m (6' 2\")   Wt 98.5 kg (217 lb 3.2 oz)   BMI 27.89 kg/m²   Injury exam:  A 3 cm laceration noted on the scalp is healing well, without evidence of infection.      Assessment:      Laceration is healing well, without evidence of infection.      Plan:      1. 9 suture (s) were removed.  2. Wound care discussed.  3. Follow-up as needed.

## 2024-12-02 DIAGNOSIS — N52.9 VASCULOGENIC ERECTILE DYSFUNCTION, UNSPECIFIED VASCULOGENIC ERECTILE DYSFUNCTION TYPE: ICD-10-CM

## 2024-12-02 RX ORDER — SILDENAFIL 50 MG/1
50 TABLET, FILM COATED ORAL PRN
Qty: 30 TABLET | Refills: 3 | Status: SHIPPED | OUTPATIENT
Start: 2024-12-02

## 2024-12-06 DIAGNOSIS — F98.8 ATTENTION DEFICIT DISORDER (ADD) WITHOUT HYPERACTIVITY: ICD-10-CM

## 2024-12-06 RX ORDER — METHYLPHENIDATE HYDROCHLORIDE 18 MG/1
18 TABLET ORAL EVERY MORNING
Qty: 30 TABLET | Refills: 0 | OUTPATIENT
Start: 2024-12-06 | End: 2025-01-05

## 2024-12-06 NOTE — TELEPHONE ENCOUNTER
Medication:   Requested Prescriptions     Pending Prescriptions Disp Refills    methylphenidate (CONCERTA) 18 MG extended release tablet 30 tablet 0     Sig: Take 1 tablet by mouth every morning for 30 days.        Last Filled:  2/13/23    Patient Phone Number: [phone] (home)     Last appt: 11/1/2024   Next appt: Visit date not found    Last OARRS:        No data to display

## 2024-12-10 ENCOUNTER — OFFICE VISIT (OUTPATIENT)
Dept: PRIMARY CARE CLINIC | Age: 34
End: 2024-12-10

## 2024-12-10 VITALS
HEIGHT: 74 IN | BODY MASS INDEX: 28.31 KG/M2 | HEART RATE: 62 BPM | SYSTOLIC BLOOD PRESSURE: 112 MMHG | WEIGHT: 220.6 LBS | DIASTOLIC BLOOD PRESSURE: 75 MMHG | TEMPERATURE: 98 F

## 2024-12-10 DIAGNOSIS — F98.8 ATTENTION DEFICIT DISORDER (ADD) WITHOUT HYPERACTIVITY: Primary | ICD-10-CM

## 2024-12-10 RX ORDER — METHYLPHENIDATE HYDROCHLORIDE 18 MG/1
18 TABLET ORAL EVERY MORNING
Qty: 30 TABLET | Refills: 0 | Status: SHIPPED | OUTPATIENT
Start: 2024-12-10 | End: 2025-01-09

## 2024-12-10 SDOH — ECONOMIC STABILITY: FOOD INSECURITY: WITHIN THE PAST 12 MONTHS, YOU WORRIED THAT YOUR FOOD WOULD RUN OUT BEFORE YOU GOT MONEY TO BUY MORE.: NEVER TRUE

## 2024-12-10 SDOH — ECONOMIC STABILITY: FOOD INSECURITY: WITHIN THE PAST 12 MONTHS, THE FOOD YOU BOUGHT JUST DIDN'T LAST AND YOU DIDN'T HAVE MONEY TO GET MORE.: NEVER TRUE

## 2024-12-10 SDOH — ECONOMIC STABILITY: INCOME INSECURITY: HOW HARD IS IT FOR YOU TO PAY FOR THE VERY BASICS LIKE FOOD, HOUSING, MEDICAL CARE, AND HEATING?: NOT VERY HARD

## 2024-12-10 SDOH — ECONOMIC STABILITY: TRANSPORTATION INSECURITY
IN THE PAST 12 MONTHS, HAS LACK OF TRANSPORTATION KEPT YOU FROM MEETINGS, WORK, OR FROM GETTING THINGS NEEDED FOR DAILY LIVING?: NO

## 2024-12-10 ASSESSMENT — ENCOUNTER SYMPTOMS
SHORTNESS OF BREATH: 0
CHEST TIGHTNESS: 0
COUGH: 0

## 2025-02-11 ENCOUNTER — OFFICE VISIT (OUTPATIENT)
Dept: PRIMARY CARE CLINIC | Age: 35
End: 2025-02-11

## 2025-02-11 VITALS
WEIGHT: 220.2 LBS | TEMPERATURE: 98.1 F | SYSTOLIC BLOOD PRESSURE: 135 MMHG | HEART RATE: 96 BPM | DIASTOLIC BLOOD PRESSURE: 87 MMHG | HEIGHT: 74 IN | BODY MASS INDEX: 28.26 KG/M2

## 2025-02-11 DIAGNOSIS — J10.1 INFLUENZA A: Primary | ICD-10-CM

## 2025-02-11 LAB
INFLUENZA A ANTIBODY: POSITIVE
INFLUENZA B ANTIBODY: NEGATIVE
Lab: NORMAL
QC PASS/FAIL: NORMAL
SARS-COV-2 RDRP RESP QL NAA+PROBE: NEGATIVE

## 2025-02-11 RX ORDER — OSELTAMIVIR PHOSPHATE 75 MG/1
75 CAPSULE ORAL 2 TIMES DAILY
Qty: 10 CAPSULE | Refills: 0 | Status: SHIPPED | OUTPATIENT
Start: 2025-02-11 | End: 2025-02-16

## 2025-02-11 SDOH — ECONOMIC STABILITY: FOOD INSECURITY: WITHIN THE PAST 12 MONTHS, YOU WORRIED THAT YOUR FOOD WOULD RUN OUT BEFORE YOU GOT MONEY TO BUY MORE.: NEVER TRUE

## 2025-02-11 SDOH — ECONOMIC STABILITY: FOOD INSECURITY: WITHIN THE PAST 12 MONTHS, THE FOOD YOU BOUGHT JUST DIDN'T LAST AND YOU DIDN'T HAVE MONEY TO GET MORE.: NEVER TRUE

## 2025-02-11 ASSESSMENT — PATIENT HEALTH QUESTIONNAIRE - PHQ9
SUM OF ALL RESPONSES TO PHQ QUESTIONS 1-9: 0
SUM OF ALL RESPONSES TO PHQ9 QUESTIONS 1 & 2: 0
SUM OF ALL RESPONSES TO PHQ QUESTIONS 1-9: 0
1. LITTLE INTEREST OR PLEASURE IN DOING THINGS: NOT AT ALL
2. FEELING DOWN, DEPRESSED OR HOPELESS: NOT AT ALL

## 2025-02-11 ASSESSMENT — ENCOUNTER SYMPTOMS
WHEEZING: 0
SORE THROAT: 1
CHEST TIGHTNESS: 0
RHINORRHEA: 1
SHORTNESS OF BREATH: 0
SINUS PAIN: 0
SINUS PRESSURE: 1
COUGH: 1

## 2025-02-11 NOTE — PROGRESS NOTES
2025     Attila Noriega (:  1990) is a 34 y.o. male, here for evaluation of the following medical concerns:    HPI  Upper Respiratory Infection  Patient complains of symptoms of a URI. Symptoms include congestion, cough, fever, chills, muscle aches, headache, and sore throat. Onset of symptoms was 1 day ago, rapidly worsening since that time.  Patient denies any other symptoms.  He is drinking plenty of fluids. Evaluation to date: none. Treatment to date: Acetaminophen, NSAID, which has been somewhat effective.      Review of Systems   Constitutional:  Positive for chills and fever.   HENT:  Positive for congestion, rhinorrhea, sinus pressure and sore throat. Negative for ear discharge, ear pain and sinus pain.    Respiratory:  Positive for cough. Negative for chest tightness, shortness of breath and wheezing.    Cardiovascular:  Negative for chest pain.   Musculoskeletal:  Positive for myalgias.   Neurological:  Positive for headaches. Negative for dizziness, weakness and light-headedness.   Hematological:  Negative for adenopathy.       Prior to Visit Medications    Medication Sig Taking? Authorizing Provider   oseltamivir (TAMIFLU) 75 MG capsule Take 1 capsule by mouth 2 times daily for 5 days Yes Hemant Perry DO   sildenafil (VIAGRA) 50 MG tablet Take 1 tablet by mouth as needed for Erectile Dysfunction Yes Hemant Perry DO   ZENATANE 40 MG chemo capsule Take 1 capsule by mouth daily Yes Provider, MD Hung   methylphenidate (CONCERTA) 18 MG extended release tablet Take 1 tablet by mouth every morning for 30 days.  Hemant Perry DO        Social History     Tobacco Use    Smoking status: Never    Smokeless tobacco: Never   Substance Use Topics    Alcohol use: Yes     Alcohol/week: 2.0 standard drinks of alcohol     Types: 2 Cans of beer per week        Vitals:    25 1053   BP: 135/87   Pulse: 96   Temp: 98.1 °F (36.7 °C)   TempSrc: Temporal   Weight: 99.9 kg (220 lb 3.2 oz)   Height:

## 2025-03-03 ENCOUNTER — OFFICE VISIT (OUTPATIENT)
Dept: PRIMARY CARE CLINIC | Age: 35
End: 2025-03-03
Payer: COMMERCIAL

## 2025-03-03 VITALS
HEART RATE: 76 BPM | TEMPERATURE: 97.5 F | SYSTOLIC BLOOD PRESSURE: 123 MMHG | BODY MASS INDEX: 27.64 KG/M2 | HEIGHT: 74 IN | DIASTOLIC BLOOD PRESSURE: 82 MMHG | WEIGHT: 215.4 LBS

## 2025-03-03 DIAGNOSIS — J20.9 ACUTE BRONCHITIS, UNSPECIFIED ORGANISM: Primary | ICD-10-CM

## 2025-03-03 PROCEDURE — 1036F TOBACCO NON-USER: CPT | Performed by: STUDENT IN AN ORGANIZED HEALTH CARE EDUCATION/TRAINING PROGRAM

## 2025-03-03 PROCEDURE — G8427 DOCREV CUR MEDS BY ELIG CLIN: HCPCS | Performed by: STUDENT IN AN ORGANIZED HEALTH CARE EDUCATION/TRAINING PROGRAM

## 2025-03-03 PROCEDURE — 99213 OFFICE O/P EST LOW 20 MIN: CPT | Performed by: STUDENT IN AN ORGANIZED HEALTH CARE EDUCATION/TRAINING PROGRAM

## 2025-03-03 PROCEDURE — G8419 CALC BMI OUT NRM PARAM NOF/U: HCPCS | Performed by: STUDENT IN AN ORGANIZED HEALTH CARE EDUCATION/TRAINING PROGRAM

## 2025-03-03 RX ORDER — METHYLPREDNISOLONE 4 MG/1
TABLET ORAL
Qty: 1 KIT | Refills: 0 | Status: SHIPPED | OUTPATIENT
Start: 2025-03-03

## 2025-03-03 ASSESSMENT — ENCOUNTER SYMPTOMS
RHINORRHEA: 0
WHEEZING: 0
CHEST TIGHTNESS: 1
COUGH: 1
SORE THROAT: 0
SHORTNESS OF BREATH: 1

## 2025-03-03 NOTE — PROGRESS NOTES
3/3/2025     Attila Noriega (:  1990) is a 34 y.o. male, here for evaluation of the following medical concerns:    HPI  Acute Bronchitis  Patient presents for evaluation of productive cough with sputum described as tan, sneezing, and chest tightness .  He was seen about 2 weeks ago diagnosed with flu.  He was feeling better, but then he went on a skiing trip in [place].  Started feeling crummy about day 2 or 3 of the trip.  Then when he got home he started having the above symptoms. Past history is significant for nothing.      Review of Systems   Constitutional:  Negative for chills and fever.   HENT:  Positive for congestion. Negative for ear discharge, ear pain, rhinorrhea and sore throat.    Respiratory:  Positive for cough, chest tightness and shortness of breath. Negative for wheezing.    Cardiovascular:  Negative for chest pain.   Neurological:  Negative for dizziness, weakness and light-headedness.   Hematological:  Negative for adenopathy.       Prior to Visit Medications    Medication Sig Taking? Authorizing Provider   amoxicillin-clavulanate (AUGMENTIN) 875-125 MG per tablet Take 1 tablet by mouth 2 times daily for 7 days Yes Hemant Perry DO   methylPREDNISolone (MEDROL DOSEPACK) 4 MG tablet Take by mouth. Yes Hemant Perry DO   sildenafil (VIAGRA) 50 MG tablet Take 1 tablet by mouth as needed for Erectile Dysfunction Yes Hemant Perry DO   ZENATANE 40 MG chemo capsule Take 1 capsule by mouth daily Yes Provider, MD Hung   methylphenidate (CONCERTA) 18 MG extended release tablet Take 1 tablet by mouth every morning for 30 days.  Hemant Perry DO        Social History     Tobacco Use    Smoking status: Never    Smokeless tobacco: Never   Substance Use Topics    Alcohol use: Yes     Alcohol/week: 2.0 standard drinks of alcohol     Types: 2 Cans of beer per week        Vitals:    25 1421   BP: 123/82   Pulse: 76   Temp: 97.5 °F (36.4 °C)   TempSrc: Temporal   Weight: 97.7 kg (215 lb 6.4

## 2025-03-26 ENCOUNTER — PATIENT MESSAGE (OUTPATIENT)
Dept: PRIMARY CARE CLINIC | Age: 35
End: 2025-03-26

## 2025-03-26 DIAGNOSIS — Z00.00 ROUTINE GENERAL MEDICAL EXAMINATION AT A HEALTH CARE FACILITY: Primary | ICD-10-CM

## 2025-03-28 DIAGNOSIS — Z00.00 ROUTINE GENERAL MEDICAL EXAMINATION AT A HEALTH CARE FACILITY: ICD-10-CM

## 2025-03-29 LAB
ALBUMIN SERPL-MCNC: 4.5 G/DL (ref 3.4–5)
ALBUMIN/GLOB SERPL: 2.1 {RATIO} (ref 1.1–2.2)
ALP SERPL-CCNC: 61 U/L (ref 40–129)
ALT SERPL-CCNC: 23 U/L (ref 10–40)
ANION GAP SERPL CALCULATED.3IONS-SCNC: 10 MMOL/L (ref 3–16)
AST SERPL-CCNC: 31 U/L (ref 15–37)
BILIRUB SERPL-MCNC: 0.3 MG/DL (ref 0–1)
BUN SERPL-MCNC: 18 MG/DL (ref 7–20)
CALCIUM SERPL-MCNC: 9.3 MG/DL (ref 8.3–10.6)
CHLORIDE SERPL-SCNC: 103 MMOL/L (ref 99–110)
CHOLEST SERPL-MCNC: 176 MG/DL (ref 0–199)
CO2 SERPL-SCNC: 26 MMOL/L (ref 21–32)
CREAT SERPL-MCNC: 1 MG/DL (ref 0.9–1.3)
GFR SERPLBLD CREATININE-BSD FMLA CKD-EPI: >90 ML/MIN/{1.73_M2}
GLUCOSE SERPL-MCNC: 83 MG/DL (ref 70–99)
HDLC SERPL-MCNC: 45 MG/DL (ref 40–60)
LDL CHOLESTEROL: 118 MG/DL
POTASSIUM SERPL-SCNC: 4.3 MMOL/L (ref 3.5–5.1)
PROT SERPL-MCNC: 6.6 G/DL (ref 6.4–8.2)
SODIUM SERPL-SCNC: 139 MMOL/L (ref 136–145)
TRIGL SERPL-MCNC: 65 MG/DL (ref 0–150)
VLDLC SERPL CALC-MCNC: 13 MG/DL

## 2025-04-01 ENCOUNTER — RESULTS FOLLOW-UP (OUTPATIENT)
Dept: PRIMARY CARE CLINIC | Age: 35
End: 2025-04-01

## 2025-04-01 LAB
SHBG SERPL-SCNC: 45 NMOL/L (ref 10–60)
TESTOST FREE SERPL-MCNC: 134.1 PG/ML (ref 47–244)
TESTOST SERPL-MCNC: 726 NG/DL (ref 249–836)

## 2025-07-24 ENCOUNTER — OFFICE VISIT (OUTPATIENT)
Dept: PRIMARY CARE CLINIC | Age: 35
End: 2025-07-24
Payer: COMMERCIAL

## 2025-07-24 VITALS
BODY MASS INDEX: 28.5 KG/M2 | TEMPERATURE: 97.5 F | OXYGEN SATURATION: 98 % | HEART RATE: 70 BPM | WEIGHT: 222 LBS | SYSTOLIC BLOOD PRESSURE: 128 MMHG | DIASTOLIC BLOOD PRESSURE: 80 MMHG

## 2025-07-24 DIAGNOSIS — Z00.00 ENCOUNTER FOR WELL ADULT EXAM WITHOUT ABNORMAL FINDINGS: Primary | ICD-10-CM

## 2025-07-24 PROCEDURE — 99395 PREV VISIT EST AGE 18-39: CPT | Performed by: STUDENT IN AN ORGANIZED HEALTH CARE EDUCATION/TRAINING PROGRAM

## 2025-07-24 ASSESSMENT — ENCOUNTER SYMPTOMS
SHORTNESS OF BREATH: 0
BLOOD IN STOOL: 0

## 2025-07-24 NOTE — PROGRESS NOTES
[facility]  Annual Wellness Exam  2025    Attila Noriega (:  1990) is a 34 y.o. male, here for anunual physical exam.     Chief Complaint   Patient presents with    Annual Exam     Labs done in March        ASSESSMENT/ PLAN  1. Encounter for well adult exam without abnormal findings  - VS reviewed, BMI reviewed. All questions answered. Appropriate healthy lifestyle modifications discussed.        Return in 1 year (on 2026) for CPE (Physical Exam).    HPI  Here for annual wellness exam.     Patient denies any concerns or complaints today, feels well and just needs a physical done for his work and insurance.     The ASCVD Risk score (Raina CARABALLO, et al., 2019) failed to calculate for the following reasons:    The 2019 ASCVD risk score is only valid for ages 40 to 79    Sexual activity: Not currently active   Last eye exam: Done recently, normal, slight color blind  Last Dental exam: around 7-8 months ago, normal  Diet: Healthy - Chicken and rice most days, occasional fruits and veggies for dinner     ROS  Review of Systems   Respiratory:  Negative for shortness of breath.    Cardiovascular:  Negative for chest pain and palpitations.   Gastrointestinal:  Negative for blood in stool.   Genitourinary:  Negative for hematuria.        HISTORIES  Current Outpatient Medications on File Prior to Visit   Medication Sig Dispense Refill    sildenafil (VIAGRA) 50 MG tablet Take 1 tablet by mouth as needed for Erectile Dysfunction 30 tablet 3     No current facility-administered medications on file prior to visit.        No Known Allergies    Past Medical History:   Diagnosis Date    ADHD (attention deficit hyperactivity disorder)        Patient Active Problem List   Diagnosis    Attention deficit disorder (ADD) without hyperactivity       Past Surgical History:   Procedure Laterality Date    ANKLE FRACTURE SURGERY Left 3/3/2023    MEDIAL MALLEOLUS FRACTURE OPEN REDUCTION INTERNAL FIXATION LEFT ANKLE,

## 2025-08-20 ENCOUNTER — HOSPITAL ENCOUNTER (OUTPATIENT)
Dept: PHYSICAL THERAPY | Age: 35
Setting detail: THERAPIES SERIES
Discharge: HOME | End: 2025-08-20
Payer: COMMERCIAL

## 2025-08-20 PROCEDURE — 97161 PT EVAL LOW COMPLEX 20 MIN: CPT

## 2025-08-20 PROCEDURE — 97110 THERAPEUTIC EXERCISES: CPT

## (undated) DEVICE — 3.0MM DEPTH GAUGE/ COUNTERSINK: Brand: MINI

## (undated) DEVICE — 3M™ TEGADERM™ TRANSPARENT FILM DRESSING FRAME STYLE, 1626W, 4 IN X 4-3/4 IN (10 CM X 12 CM), 50/CT 4CT/CASE: Brand: 3M™ TEGADERM™

## (undated) DEVICE — NEEDLE HYPO 25GA L1.5IN BLU POLYPR HUB S STL REG BVL STR

## (undated) DEVICE — CANNULATED DRILL BIT: Brand: MINI

## (undated) DEVICE — 3M™ TEGADERM™ TRANSPARENT FILM DRESSING FRAME STYLE, 1624W, 2-3/8 IN X 2-3/4 IN (6 CM X 7 CM), 100/CT 4CT/CASE: Brand: 3M™ TEGADERM™

## (undated) DEVICE — ROYALSILK SURGICAL GOWN, L: Brand: CONVERTORS

## (undated) DEVICE — SUTURE VCRL + SZ 3-0 L27IN ABSRB UD L26MM SH 1/2 CIR VCP416H

## (undated) DEVICE — BANDAGE COMPR W6INXL12FT SMOOTH FOR LIMB EXSANG ESMARCH

## (undated) DEVICE — GOWN,SIRUS,POLYRNF,SETINSLV,L,20/CS: Brand: MEDLINE

## (undated) DEVICE — 3M™ COBAN™ NL STERILE NON-LATEX SELF-ADHERENT WRAP, 2084S, 4 IN X 5 YD (10 CM X 4,5 M), 18 ROLLS/CASE: Brand: 3M™ COBAN™

## (undated) DEVICE — IMPLANTABLE DEVICE
Type: IMPLANTABLE DEVICE | Site: ANKLE | Status: NON-FUNCTIONAL
Brand: ORTHOLOC
Removed: 2023-03-03

## (undated) DEVICE — GLOVE SURG SZ 75 L12IN FNGR THK94MIL STD WHT LTX FREE

## (undated) DEVICE — T-DRAPE,EXTREMITY,STERILE: Brand: MEDLINE

## (undated) DEVICE — INTENDED FOR TISSUE SEPARATION, AND OTHER PROCEDURES THAT REQUIRE A SHARP SURGICAL BLADE TO PUNCTURE OR CUT.: Brand: BARD-PARKER ® STAINLESS STEEL BLADES

## (undated) DEVICE — SYRINGE MED 10ML LUERLOCK TIP W/O SFTY DISP

## (undated) DEVICE — 4.0MM DEPTH GAUGE/ COUNTERSINK: Brand: MINI

## (undated) DEVICE — NON-THREADED KWIRE
Type: IMPLANTABLE DEVICE | Site: ANKLE | Status: NON-FUNCTIONAL
Brand: MINI
Removed: 2023-03-03

## (undated) DEVICE — DRAPE EQUIP C ARM MINI 10000100] TIDI PRODUCTS INC]

## (undated) DEVICE — PAD,NON-ADHERENT,3X8,STERILE,LF,1/PK: Brand: MEDLINE

## (undated) DEVICE — IMPLANTABLE DEVICE
Type: IMPLANTABLE DEVICE | Site: ANKLE | Status: NON-FUNCTIONAL
Brand: ORTHOLOC 3DI PLATING SYSTEM
Removed: 2023-03-03

## (undated) DEVICE — K-WIRE W/ LONG SMOOTH TIP
Type: IMPLANTABLE DEVICE | Site: ANKLE | Status: NON-FUNCTIONAL
Removed: 2023-03-03

## (undated) DEVICE — SUTURE VCRL + SZ 2-0 L18IN ABSRB UD CT1 L36MM 1/2 CIR VCP839D

## (undated) DEVICE — BANDAGE,GAUZE,CONFORMING,3"X75",STRL,LF: Brand: MEDLINE

## (undated) DEVICE — ELECTRODE PT RET AD L9FT HI MOIST COND ADH HYDRGEL CORDED

## (undated) DEVICE — GLOVE ORANGE PI 7   MSG9070

## (undated) DEVICE — SHEET,DRAPE,53X77,STERILE: Brand: MEDLINE

## (undated) DEVICE — PACK EXTREMITY XR

## (undated) DEVICE — DRILL BIT 2.5MM X 60MM

## (undated) DEVICE — GOWN,SIRUS,NON REINFRCD,LARGE,SET IN SL: Brand: MEDLINE

## (undated) DEVICE — GLOVE SURG SZ 8 L12IN FNGR THK94MIL STD WHT LTX FREE

## (undated) DEVICE — SOLUTION IV IRRIG POUR BRL 0.9% SODIUM CHL 2F7124

## (undated) DEVICE — GLOVE SURG SZ 65 L12IN FNGR THK79MIL GRN LTX FREE

## (undated) DEVICE — PADDING CAST W4INXL4YD NONSTERILE COT RAYON MICROPLEATED

## (undated) DEVICE — Device

## (undated) DEVICE — ZIP 8I SURGICAL SKIN CLOSURE DEVICE: Brand: ZIP 8I SURGICAL SKIN CLOSURE DEVICE

## (undated) DEVICE — CONVERTORS STOCKINETTE: Brand: CONVERTORS

## (undated) DEVICE — SUTURE MCRYL + SZ 4-0 L18IN ABSRB UD L19MM PS-2 3/8 CIR MCP496G

## (undated) DEVICE — GLOVE SURG SZ 65 L12IN FNGR THK94MIL STD WHT LTX FREE

## (undated) DEVICE — DRAPE,U/ SHT,SPLIT,PLAS,STERIL: Brand: MEDLINE

## (undated) DEVICE — ZIMMER® STERILE DISPOSABLE TOURNIQUET CUFF WITH PLC, DUAL PORT, SINGLE BLADDER, 30 IN. (76 CM)

## (undated) DEVICE — PADDING CAST W4INXL4YD ST COT RAYON MICROPLEATED HIGHLY